# Patient Record
Sex: FEMALE | Race: WHITE | NOT HISPANIC OR LATINO | Employment: OTHER | URBAN - METROPOLITAN AREA
[De-identification: names, ages, dates, MRNs, and addresses within clinical notes are randomized per-mention and may not be internally consistent; named-entity substitution may affect disease eponyms.]

---

## 2017-01-04 ENCOUNTER — CLINICAL SUPPORT (OUTPATIENT)
Dept: REHABILITATION | Facility: HOSPITAL | Age: 32
End: 2017-01-04
Attending: PHYSICAL MEDICINE & REHABILITATION
Payer: COMMERCIAL

## 2017-01-04 DIAGNOSIS — I63.512 CEREBRAL INFARCTION DUE TO OCCLUSION OF LEFT MIDDLE CEREBRAL ARTERY: Primary | ICD-10-CM

## 2017-01-04 PROCEDURE — 92507 TX SP LANG VOICE COMM INDIV: CPT | Mod: PN

## 2017-01-04 NOTE — PROGRESS NOTES
"TIME RECORD    Date:  01/04/2017    Start Time:  10:04  Stop Time:  10:58    PROCEDURES:    TIMED  Procedure Time Min.    Start:  Stop:     Start:  Stop:     Start:  Stop:     Start:  Stop:          UNTIMED  Procedure Time Min.   Speech & language tx Start:10:04  Stop: 10:58  54    Start:  Stop:      Total Untimed Units:  1  Charges Billed/# of units:  1      Progress/Current Status    Subjective:     Patient ID: Amy Orta is a 31 y.o. female.  Diagnosis:   1. Cerebral infarction due to occlusion of left middle cerebral artery       Pain: 0 /10  Mrs. Orta denies pain today.  She presents alert, cooperative and explains, "We had a nice time over the holidays." Patient presents for first visit since 12/19/2016 2/2 holiday plans.    Objective:     1. Patient will write short paragraph description (4-5 sentences) for picture scene with appropriate length sentences and 90% accuracy on spelling, MOD I.  Fx writing tasks today for instructions/directions from school to house, SLP noted Patient requires extra time, modified  also noted 2/2 decreased R UE mobility.   2. Patient will complete moderate level abstract reasoning tasks with 90% accuracy, MOD I, to improve verbal reasoning.  Today she explains the meaning of metaphor/simile 12/14 I'ly, requires min a for word-finding to explain 14/14.   3. Patient will appropriately participate in short (5-10 minute) one-to-one conversations with appropriate turn-taking and topic maintenance 90% of the time, Independently, to improve expressive language.  Today she reviewed holdays w/tih ST for 8 minutes with proper give and take and initiation of questions, I'ly.    4. Patient will complete oral reading tasks at the paragraph level with 90% intelligibility, MOD I, to improve speech.  5 paragraphs with 6 verbal cues for repetition.    5. Patient will discriminate between intelligible and unintelligible speech 90% of the time t/o therapy tasks, Independently  6. " Patient will accurately produce s-blends in all positions, 90% of attempts, MOD I .  SLP noted MIld difficulty with St/SK at the sentence level 4/14 attempts today.    Assessment:     Mrs. 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 She requires extra time and decreased distractions for fx writing tasks this service day.  She completes  Oral reading tasks at the paragraph level provided minimal verbal.vosual cueing to monitor ROS and articulatory precision for multisyllbic words (i.e. Strategies.) She self-monitors speech following set-up. Noted improvement on verbal abstraction tasks today.     Patient Education/Response:     Mrs. Orta is enthusiastic t/o session and participates well with ST. She is encouraged today to continue oral reading, fx writing and verbal abstraction tasks daily for carryover of compensatory word-finding and speech strategies. She v/u recommendations.      Plans and Goals:     Continue POC.   DIANA Hatch., CCC-SLP

## 2017-01-06 ENCOUNTER — CLINICAL SUPPORT (OUTPATIENT)
Dept: REHABILITATION | Facility: HOSPITAL | Age: 32
End: 2017-01-06
Attending: PHYSICAL MEDICINE & REHABILITATION
Payer: COMMERCIAL

## 2017-01-06 DIAGNOSIS — I63.512 CEREBRAL INFARCTION DUE TO OCCLUSION OF LEFT MIDDLE CEREBRAL ARTERY: Primary | ICD-10-CM

## 2017-01-06 PROCEDURE — 92507 TX SP LANG VOICE COMM INDIV: CPT | Mod: PN

## 2017-01-06 NOTE — PROGRESS NOTES
"TIME RECORD    Date:  01/06/2017    Start Time:  10:05  Stop Time:  11:00    PROCEDURES:    TIMED  Procedure Time Min.    Start:  Stop:     Start:  Stop:     Start:  Stop:     Start:  Stop:          UNTIMED  Procedure Time Min.   Speech & language tx Start:10:05  Stop: 11:00  54    Start:  Stop:      Total Untimed Units:  1  Charges Billed/# of units:  1      Progress/Current Status    Subjective:     Patient ID: Amy Orta is a 31 y.o. female.  Diagnosis:   1. Cerebral infarction due to occlusion of left middle cerebral artery       Pain: 0 /10  Mrs. Orta denies pain today.  She presents alert, cooperative and explains, "My boss wants me to return to 30 hours/week after January."    Objective:     1. Patient will write short paragraph description (4-5 sentences) for picture scene with appropriate length sentences and 90% accuracy on spelling, MOD I.  Fx writing tasks today for pt information form.  She completes writing tasks at word level with correct spelling 100% of attempts t/o abstraction tasks.   2. Patient will complete moderate level abstract reasoning tasks with 90% accuracy, MOD I, to improve verbal reasoning.  Today she explains 2+ ea similarities and differences 18/20 attempts I'ly, requiring verbal prompts x2 to complete 20/20. She lists items in a sequence 10/10 attempts, I'ly.    3. Patient will appropriately participate in short (5-10 minute) one-to-one conversations with appropriate turn-taking and topic maintenance 90% of the time, Independently, to improve expressive language.  Today discussed job for 9 minutes with mild word-finding difficulty noted c/b mildly delayed response time and mild hesitations requiring her to utilize pacing/breathing.  Patient with with proper give and take and initiation of questions, I'ly.    4. Patient will complete oral reading tasks at the paragraph level with 90% intelligibility, MOD I, to improve speech. 2 paragraphs today   5. Patient will discriminate " between intelligible and unintelligible speech 90% of the time t/o therapy tasks, Independently.. Patient requires Supervision for 2 verbal cues for repetition (dipthongs)   6. Patient will accurately produce s-blends in all positions, 90% of attempts, MOD I .  SLP noted MIld difficulty with St/SK in final position of words today.  Reads 11/12 ST/SN/SW blends in initial position WFL.     Assessment:     Mrs. 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 She demonstrates increased response time and word-finding t/o verbal abstraction tasks today for similarities and differences. She requires extra time and decreased distractions for fx writing tasks.  She completes  Oral reading tasks at the paragraph level provided minimal verbal/visual cueing to monitor ROS and articulatory precision for multisyllbic words (i.e. Strategies.) She self-monitors speech t/o oral reading and conversational speech tasks today.  She continues to require minimal verbal cueing for placement of articulators for SK blends in final position.     Patient Education/Response:     Mrs. Orta is enthusiastic and demonstrates excellent effort t/o session.  Patient highly motivated. She is encouraged today to continue oral reading, fx writing tasks daily at the home level.  Patient confirms she will bring in lap-top for mock-work conversation/tasks next session.      Plans and Goals:     Continue POC.   DIANA Hatch., CCC-SLP

## 2017-01-09 ENCOUNTER — CLINICAL SUPPORT (OUTPATIENT)
Dept: REHABILITATION | Facility: HOSPITAL | Age: 32
End: 2017-01-09
Attending: PHYSICAL MEDICINE & REHABILITATION
Payer: COMMERCIAL

## 2017-01-09 ENCOUNTER — CLINICAL SUPPORT (OUTPATIENT)
Dept: REHABILITATION | Facility: HOSPITAL | Age: 32
End: 2017-01-09
Attending: PSYCHIATRY & NEUROLOGY
Payer: COMMERCIAL

## 2017-01-09 DIAGNOSIS — Z78.9 IMPAIRED INSTRUMENTAL ACTIVITIES OF DAILY LIVING (IADL): ICD-10-CM

## 2017-01-09 DIAGNOSIS — I63.512 CEREBRAL INFARCTION DUE TO OCCLUSION OF LEFT MIDDLE CEREBRAL ARTERY: Primary | ICD-10-CM

## 2017-01-09 DIAGNOSIS — R29.898 DECREASED GRIP STRENGTH OF RIGHT HAND: ICD-10-CM

## 2017-01-09 DIAGNOSIS — R68.89 IMPAIRED FUNCTION OF UPPER EXTREMITY: Primary | ICD-10-CM

## 2017-01-09 PROCEDURE — 92507 TX SP LANG VOICE COMM INDIV: CPT | Mod: PN

## 2017-01-09 PROCEDURE — 97530 THERAPEUTIC ACTIVITIES: CPT | Mod: PN

## 2017-01-09 PROCEDURE — 97110 THERAPEUTIC EXERCISES: CPT | Mod: PN

## 2017-01-09 NOTE — PROGRESS NOTES
"Occupational Therapy    Date:  01/09/2017  Start Time:  0915  Stop Time:  1000    TIMED  Procedure Time Min.   TherEx (2) Start:  Stop: 30   TherAct (1) Start:  Stop: 15   Total Timed Minutes:  45  Total Timed Units:  3  Total Untimed Units:  0  Charges Billed/# of units:  3    Progress/Current Status    Subjective:     Patient ID: Amy Orta is a 31 y.o. female.  Diagnosis:   1. Impaired function of upper extremity     2. Lack of coordination due to stroke     3. Decreased  strength of right hand     4. Impaired instrumental activities of daily living (IADL)       Pain: 3 /10 in arm distal to elbow. It mostly "aches," especially since it got cold. Pt denies pain proximal to elbow. She says she has been trying to use the arm more, but does not notice any functional change. She does note that the L UE often assists the right.  She stopped doing cross-fit about 2 months ago as there were changes in staff and she did not feel as comfortable.    Objective:     MCP extension while holding pen, 10x5 reps.   - 10, 19, 15 (avg=18.67)  FMC: 9HPT: 89s  Pronation/supination for pouring water x30 in each direction + x10 for pronation only w/ mod cues to "keep elbow in."  MMT adduction 3+/4, ER 4-/5, extension 4-/5.  Red t-band for shoulder extension, adduction and ER, 3x10 each (+ HEP)    Assessment:     This is only pt's 3rd visit in the past 5 weeks d/t holidays and cancellations. She reports incorporating the R UE at home, but does state the the L UE often assists the R. She demo's a slight decline in FMC and  strength compared to 12/14. She can continue to benefit from skilled OT as she continues to have potential to improve R UE function for eventual return to work and improved indep and efficiency with IADLs.    Patient Education/Response:     Force use of the R UE at home. Restrain the L UE if necessary. Pt v/u.    Plans and Goals:     Review new exs for proximal strengthening and progress as " tolerated. [facility] [place].    Aleta Nixon, MORIAH, LOTR  1/9/2017

## 2017-01-09 NOTE — PROGRESS NOTES
"TIME RECORD    Date:  01/09/2017    Start Time:  10:07  Stop Time: 11:04    PROCEDURES:    TIMED  Procedure Time Min.    Start:  Stop:     Start:  Stop:     Start:  Stop:     Start:  Stop:          UNTIMED  Procedure Time Min.   Speech and Language tx Start:10:07  Stop:11:04 53    Start:  Stop:      Total Untimed Units:  1  Charges Billed/# of units:  1      Progress/Current Status    Subjective:     Patient ID: Amy Orta is a 31 y.o. female.  Diagnosis:   1. Cerebral infarction due to occlusion of left middle cerebral artery       Pain: 0 /10  She denies pain this service day. She presents alert and with her  in waiting room. SLP discussed timelines for return to work with Patient and spouse prior to session. Her spouse explains, "I noticed when she was doing her re-certifications for her job it was exhausting for her, if she is having to make decisions on something it makes it harder. "  Mrs. Orta explains, "I am going to follow up with boss today after work he asked me to let me know how it went today with my laptop."   Objective:     2. Patient will complete moderate level abstract reasoning tasks with 90% accuracy, MOD I, to improve verbal reasoning.  Fx similarities/differences with work terms todayx6 ( i.e correction, QC, exceptions; shift v. trend) provided extra time and Supervision for use of word-finding strategies.    3. Patient will appropriately participate in short (5-10 minute) one-to-one conversations with appropriate turn-taking and topic maintenance 90% of the time, Independently, to improve expressive language. Today she completed 40 minute demo of work tasks/reports processes for ITT EXIM system in mock training scenario.    5. Patient will discriminate between intelligible and unintelligible speech 90% of the time t/o therapy tasks, Independently    Assessment:     Mrs. Orta presents with Mild Anomic Aphasia and Mild Dysarthria. Mild word-finding difficulty noted t/o verbal " "reasoning (explain the difference in shift v. Trend)  characterized by mild hesitations and mild prolongations of syllable sin initial position of words. She demonstrates proper response time to all questions asked by SLP and appropriate topic-maintenance t/o mock training, Independently.  She continues to demonstrate mildly decreased articulatory precision with /r/ v. /l/ and s-blends (s/sh,) requiring minimal verbal/visual cueing from SLP to re-attempt with slower ROS.  Amy self-monitors intelligibly of work terminology t/o mock training provided Supervision for clarification x4 on multipsyllabic work terms (i.e. "shift, critical, SD2.')     Patient Education/Response:     Patient is highly motivated.  She expresses increased anxiety today about demands of work load with dependency of R UE for typing/instrumentation.  She is encouraged today to incorporate more time at computer at the home level (i.e. Oral reading, emailing, multi-tasking) to increase endurance. SLP also discussed mock phone calls/speech drills with listener for Patient to drill on work terminology and receive immediate feedback.  Amy v/pat SLP recommendations for ongoing HEP.     Plans and Goals:     ST to continue to follow.   DIANA Hatch., CCC-SLP      "

## 2017-01-11 ENCOUNTER — CLINICAL SUPPORT (OUTPATIENT)
Dept: REHABILITATION | Facility: HOSPITAL | Age: 32
End: 2017-01-11
Attending: PHYSICAL MEDICINE & REHABILITATION
Payer: COMMERCIAL

## 2017-01-11 DIAGNOSIS — R68.89 IMPAIRED FUNCTION OF UPPER EXTREMITY: Primary | ICD-10-CM

## 2017-01-11 DIAGNOSIS — R29.898 DECREASED GRIP STRENGTH OF RIGHT HAND: ICD-10-CM

## 2017-01-11 DIAGNOSIS — Z78.9 IMPAIRED INSTRUMENTAL ACTIVITIES OF DAILY LIVING (IADL): ICD-10-CM

## 2017-01-11 PROCEDURE — 97110 THERAPEUTIC EXERCISES: CPT | Mod: PN

## 2017-01-11 PROCEDURE — 97530 THERAPEUTIC ACTIVITIES: CPT | Mod: PN

## 2017-01-11 NOTE — PROGRESS NOTES
Occupational Therapy  Date:  01/11/2017    Start Time:  0910  Stop Time:  1004    TIMED  Procedure Time Min.   TherAct (2) Start:  Stop: 24   TherEx (2) Start:  Stop: 30   Total Timed Minutes:  54  Total Timed Units:  4  Total Untimed Units:  0  Charges Billed/# of units:  4    Progress/Current Status    Subjective:     Patient ID: Amy Orta is a 31 y.o. female.  Diagnosis:   1. Impaired function of upper extremity     2. Lack of coordination due to stroke     3. Decreased  strength of right hand     4. Impaired instrumental activities of daily living (IADL)       Pain: 0 /10  Pt admits to using the L UE mostly when cooking at home.    Objective:     MCP extension while holding pen, 10x5 reps.  Ball and oven mitt over the L UE to force R UE function.  Yellow putty for squeeze and turn x~20 (using table if necessary to reposition putty). Roll out putty w/ the R UE and then pinch down length w/ 3-jaw velma x2 sets.  Large nut/bolt to undo/ redo x1 w/ focus on having contact between thumb, IF & MF on bolt (on R side of board). Attempted a 2nd bolt (in middle of board), but unable.  3# for wrist extension 3x10, RD 3x10    Red t-band for adduction, extension & ER, 3x10.  Hand writing - pt still having difficulty positioning fingers and pronating sufficiently for pen to keep contact with paper.    Assessment:     R UE function limited by decreased motor planning, weakness and FMC impairments. Amy can continue to benefit from skilled OT to address these concerns for increased indep and efficiency with all tasks.    Patient Education/Response:     Continue HEP. OT stressed importance of pt forcing functional use of the R UE EVERY DAY for AT LEAST 1 hour. Pt v/u    Plans and Goals:     Continue strengthening, coordination, fxl use R UE.    Aleta Nixon, MORIAH, LOTR  1/11/2017

## 2017-01-18 ENCOUNTER — CLINICAL SUPPORT (OUTPATIENT)
Dept: REHABILITATION | Facility: HOSPITAL | Age: 32
End: 2017-01-18
Attending: PHYSICAL MEDICINE & REHABILITATION
Payer: COMMERCIAL

## 2017-01-18 DIAGNOSIS — Z78.9 IMPAIRED INSTRUMENTAL ACTIVITIES OF DAILY LIVING (IADL): ICD-10-CM

## 2017-01-18 DIAGNOSIS — R68.89 IMPAIRED FUNCTION OF UPPER EXTREMITY: Primary | ICD-10-CM

## 2017-01-18 DIAGNOSIS — R29.898 DECREASED GRIP STRENGTH OF RIGHT HAND: ICD-10-CM

## 2017-01-18 DIAGNOSIS — I63.512 CEREBRAL INFARCTION DUE TO OCCLUSION OF LEFT MIDDLE CEREBRAL ARTERY: Primary | ICD-10-CM

## 2017-01-18 PROCEDURE — 97530 THERAPEUTIC ACTIVITIES: CPT | Mod: PN

## 2017-01-18 PROCEDURE — 97110 THERAPEUTIC EXERCISES: CPT | Mod: PN

## 2017-01-18 PROCEDURE — 92507 TX SP LANG VOICE COMM INDIV: CPT | Mod: PN

## 2017-01-18 NOTE — PROGRESS NOTES
Occupational Therapy    Date:  01/18/2017  Start Time:  0918  Stop Time:  1000    TIMED  Procedure Time Min.   TherAct (2) Start:  Stop: 32   TherEx (1) Start:  Stop: 10   Total Timed Minutes:  42  Total Timed Units:  3  Total Untimed Units:  0  Charges Billed/# of units:  3    Progress/Current Status    Subjective:     Patient ID: Amy Orta is a 31 y.o. female.  Diagnosis:   1. Impaired function of upper extremity     2. Lack of coordination due to stroke     3. Decreased  strength of right hand     4. Impaired instrumental activities of daily living (IADL)       Pain: 0 /10  Going to work tomorrow for 4 hours. Plan is to do 4 hours at work this week and 4 hours at home next week and build from there as she can tolerate.  She says she is nervous. Tomorrow, she anticipates she will have to sign on to the computer and mostly do paperwork.  She says she used the R UE more at home this week.    Objective:     Using L UE to stabilize only, pt used R UE to open various containers with screw-on and pry-off lids, multiple shapes and sizes, x22.  Pt noted to have hyperextension of RF PIP and decreased control of that digit..  KT to manually correct for increased RF PIP flexion and to facilitate RF dorsal interosseus.  Tall nut/bolt board for the 3 largest to undo and re-do nuts. Board positioned parallel to pt to simulate positions of knobs at work. Min A to initiate turning 1/3 knobs d/t difficulty. She does note that most of the knobs she has to manipulate at work are in tight spaces. She will take pictures at work tomorrow.  Green t-band for shoulder ER, extension and adduction, 2x10.     Assessment:     R hand function continues to be better with manual correction and facilitation of RF. Hopefully after some exposure to her work environment, pt will be able to ID additional tasks that we can work to address in clinic. Pt can continue to benefit from skilled OT to improve R UE function for increased indep w/  work and IADL tasks.    Patient Education/Response:     Continue HEP w/ green band, continue working on MP extension w/ IP flexion.    Plans and Goals:     R UE FMC and strengthening for work-related tasks.    Aleta Nixon, MORIAH, LOTR  1/18/2017

## 2017-01-18 NOTE — PROGRESS NOTES
"TIME RECORD    Date:  01/18/2017    Start Time: 10:07  Stop Time: 11:02    PROCEDURES:    TIMED  Procedure Time Min.    Start:  Stop:     Start:  Stop:     Start:  Stop:     Start:  Stop:          UNTIMED  Procedure Time Min.   Speech & Language Start:10:07  Stop:11:02  55 min.    Start:  Stop:      Total Untimed Units:  1  Charges Billed/# of units:  1    Progress/Current Status    Subjective:     Patient ID: Amy Orta is a 31 y.o. female.  Diagnosis:   1. Cerebral infarction due to occlusion of left middle cerebral artery       Pain: 0 /10  "I'm going to try going back to work for four hours tomorrow, Krunal will drive me."    Objective:     1. Patient will write short paragraph description (4-5 sentences) for picture scene with appropriate length sentences and 90% accuracy on spelling, MOD I. Email tasks, short paragraph level)x2 this service day  3. Patient will appropriately participate in short (5-10 minute) one-to-one conversations with appropriate turn-taking and topic maintenance 90% of the time, Independently, to improve expressive language  Patient completes conversation re: return to work for 12 minutes with appropriate give and take and appropriate length responses to all questions from communication partner. MOD I.   4. Patient will complete oral reading tasks at the paragraph level with 90% intelligibility, MOD I, to improve speech. see note  5. Patient will discriminate between intelligible and unintelligible speech 90% of the time t/o therapy tasks, Independently.  Ongoing Supervision (prompts x4 today.)  6. Patient will accurately produce s-blends in all positions, 90% of attempts, MOD I.   Focus on sk blends today-14 sentences, 4-paragraph story  7. Patient will accurately produce lingual-alveolar sounds (t,d,s,z) in all positions, 90% of attempts, MOD I. Focus on t/d discrimination tasks at the multisyllabic (x10) level, sentence level (x16) and at the paragraph level (4 paragraph-length " story.)    Assessment:     Patient with mild Dysarthria. She completes fx writing tasks (email tasks, short paragraph level)x2 this service day, with appropriate length sentences and 90% accuracy on spelling, MOD I. She completes oral reading tasks at the paragraph level provided SPV for clarification of s-blend and fricative sounds in medial position. Patient completes sentence-drills for s-blends and t/d discrimination tasks with 90% accuracy across all attempts. She self-monitors ROS I'ly this service day. She demosntrates increased prosody t/o oral reading tasks at both the sentence and paragraph level.     Patient Education/Response:     Patient educated on pacing strategies and ongoing compensatory strategies for speech and language for anticipated return to work. Patient v/u recommendations.     Plans and Goals:     Continue per POC.   DIANA Hatch., CCC-SLP

## 2017-01-23 ENCOUNTER — CLINICAL SUPPORT (OUTPATIENT)
Dept: REHABILITATION | Facility: HOSPITAL | Age: 32
End: 2017-01-23
Attending: PHYSICAL MEDICINE & REHABILITATION
Payer: COMMERCIAL

## 2017-01-23 ENCOUNTER — TELEPHONE (OUTPATIENT)
Dept: NEUROLOGY | Facility: CLINIC | Age: 32
End: 2017-01-23

## 2017-01-23 DIAGNOSIS — Z78.9 IMPAIRED INSTRUMENTAL ACTIVITIES OF DAILY LIVING (IADL): ICD-10-CM

## 2017-01-23 DIAGNOSIS — R29.898 DECREASED GRIP STRENGTH OF RIGHT HAND: ICD-10-CM

## 2017-01-23 DIAGNOSIS — R68.89 IMPAIRED FUNCTION OF UPPER EXTREMITY: Primary | ICD-10-CM

## 2017-01-23 PROCEDURE — 97530 THERAPEUTIC ACTIVITIES: CPT | Mod: PN

## 2017-01-23 NOTE — TELEPHONE ENCOUNTER
"Spoke with patient's . He states that the patient is out of her Percocet and needs a refill. He also states that the patient says "They help a little, just enough to take the edge off, but they don't knock it out and I am still unable to sleep at night". He was wondering if instead of filling this there was possibly something else she could try. Message sent to Dr. Mcginnis.     "

## 2017-01-23 NOTE — PROGRESS NOTES
Occupational Therapy    Date:  01/23/2017  Start Time:  0907  Stop Time:  1005    TIMED  Procedure Time Min.   TherAct (4) Start:  Stop: 58   Total Timed Minutes:  58  Total Timed Units:  4  Total Untimed Units:  0  Charges Billed/# of units:  4    Progress/Current Status    Subjective:     Patient ID: Amy Orta is a 31 y.o. female.  Diagnosis:   1. Impaired function of upper extremity     2. Lack of coordination due to stroke     3. Decreased  strength of right hand     4. Impaired instrumental activities of daily living (IADL)       Pain: 0 /10  Pt said she did go to work Thursday. While there, she wrote, typed, pushed touch screen buttons with the R UE, used the mouse w/ the R UE  She brought test tubes, insert cup and pipette to practice manipulation with the R UE.    Objective:     Pt used the R UE to hold test tube while the R thumb pushed off the lid. Pt unable to complete this task holding the tube upright, so she completed it with the tube to the side x5 trials w/ increased time.  Using pipette, pt used the R UE to move water from one tube to another, holding both tubes with the L UE, as she would have done at work; ~15 reps in each direction.  MCP extension AROM while holding test-tube, 4x3 reps.  KT for manual correction to increase RF PIP flexion/ decrease RF PIP hyperextension. KT to facilitate R RF dorsal interosseus, opponens and FPL, all applied proximal->distal w/ ~25% tension.  Manipulation of nut/bolt to simulate work environment. Perf'd 1 medium set reaching underneath, 3 medium to large sets from the right.  Pt using phone to look up information and automatically using the L UE to scroll and type. Pt ed to use the R UE for this. She scrolled and typed with the R UE w/ significantly increased time.    Assessment:     Good initial effort simulating work tasks with test tubes, pipette. R hand remains with impaired coordination, motor planning and strength, all which impair  function.    Patient Education/Response:     Practice pushing lids off test tubes, MCP extension, green band, use R UE on phone! Pt v/u.    Plans and Goals:     Continue strengthening, coordination, simulation of work tasks.    Aleta Nixon, MORIAH, LOTR  1/23/2017

## 2017-01-24 ENCOUNTER — PATIENT MESSAGE (OUTPATIENT)
Dept: NEUROLOGY | Facility: CLINIC | Age: 32
End: 2017-01-24

## 2017-01-24 RX ORDER — OXYCODONE AND ACETAMINOPHEN 7.5; 325 MG/1; MG/1
1 TABLET ORAL EVERY 6 HOURS PRN
Qty: 20 TABLET | Refills: 0 | Status: SHIPPED | OUTPATIENT
Start: 2017-01-24 | End: 2017-03-15 | Stop reason: SDUPTHER

## 2017-01-24 NOTE — TELEPHONE ENCOUNTER
Informed patient that Dr. Mcginins refilled her Percocet at a higher dose. Patient thanked me and verbalized understanding.

## 2017-01-30 ENCOUNTER — CLINICAL SUPPORT (OUTPATIENT)
Dept: REHABILITATION | Facility: HOSPITAL | Age: 32
End: 2017-01-30
Attending: PHYSICAL MEDICINE & REHABILITATION
Payer: COMMERCIAL

## 2017-01-30 DIAGNOSIS — R29.898 DECREASED GRIP STRENGTH OF RIGHT HAND: ICD-10-CM

## 2017-01-30 DIAGNOSIS — Z78.9 IMPAIRED INSTRUMENTAL ACTIVITIES OF DAILY LIVING (IADL): ICD-10-CM

## 2017-01-30 DIAGNOSIS — R68.89 IMPAIRED FUNCTION OF UPPER EXTREMITY: Primary | ICD-10-CM

## 2017-01-30 DIAGNOSIS — I63.512 CEREBRAL INFARCTION DUE TO OCCLUSION OF LEFT MIDDLE CEREBRAL ARTERY: Primary | ICD-10-CM

## 2017-01-30 PROCEDURE — 97530 THERAPEUTIC ACTIVITIES: CPT | Mod: PN

## 2017-01-30 PROCEDURE — 92507 TX SP LANG VOICE COMM INDIV: CPT | Mod: PN

## 2017-01-30 NOTE — PROGRESS NOTES
"TIME RECORD    Date:  01/30/2017    Start Time:  10:06  Stop Time:  11:00    PROCEDURES:    TIMED  Procedure Time Min.    Start:  Stop:     Start:  Stop:     Start:  Stop:     Start:  Stop:          UNTIMED  Procedure Time Min.   Speech tx Start:10:06  Stop:11:00  54    Start:  Stop:        Total Untimed Units:  1  Charges Billed/# of units: 1      Progress/Current Status    Subjective:     Patient ID: Amy Orta is a 31 y.o. female.  Diagnosis:   1. Cerebral infarction due to occlusion of left middle cerebral artery       Pain: 0 /10  Mrs. Orta denies pain. She explains, "I was so tired after I left work, I was asleep before we even got home."     Objective:     3. Patient will appropriately participate in short (5-10 minute) one-to-one conversations with appropriate turn-taking and topic maintenance 90% of the time, Independently, to improve expressive language.  13 minutes conversation re: trial return to work today     4. Patient will complete oral reading tasks at the paragraph level with 90% intelligibility, MOD I, to improve speech.  She completes phoneme specific stories for s-blends and t/d discrimination x3 today, 4+ paragraphs ea    5. Patient will discriminate between intelligible and unintelligible speech 90% of the time t/o therapy tasks, Independently    6. Patient will accurately produce s-blends in all positions, 90% of attempts, MOD I.  14/15 mutlisyllabic, 13/14 sentences for st/sp, MOD I    7. Patient will accurately produce lingual-alveolar sounds (t,d,s,z) in all positions, 90% of attempts, MOD I.  t/d discrimnation tasks at word and sentence level x16 ea, MOD I      Assessment:     Mrs. Orta presents with Mild Anomic Aphasia and Mild Speech Impairment characterized by mildly decreased articualtory precision with lingual alveolars, glides, dipthongs and fricatives in medial and final positions. SHe self-monitors speech and utilizes clear speech strategies, MOD I. She demonstrates mild " "word-finding difficulty t/o verbal reasoning and structred conversational tasks this service day  characterized by hesitations and interjections ("um, "uh,"oh what is it,")  Requiring set-up of word-finding strategiesx1 and phonemic cues x2.     Patient Education/Response:     Patient alert and enthusiastic for session. She reports ongoing Migraine headaches daily. She reports ongoing mild difficulty with explanations of work procedures and technical terms.  She is provided compensatory strategies for word-finding and conversational speech tasks today along with recommendations for use of environmental and behavioral modifications to reduce distractions for sustained endurance and attention t/o work tasks.     Plans and Goals:     Continue per POC.  DIANA Hatch., CCC-SLP      "

## 2017-01-30 NOTE — PROGRESS NOTES
Occupational Therapy    Date:  01/30/2017  Start Time:  0908  Stop Time:  1004    TIMED  Procedure Time Min.   therAct (4) Start:  Stop: 56   Total Timed Minutes:  56  Total Timed Units:  4  Total Untimed Units:  0  Charges Billed/# of units:  4    Progress/Current Status    Subjective:     Patient ID: Amy Orta is a 31 y.o. female.  Diagnosis:   1. Impaired function of upper extremity     2. Lack of coordination due to stroke     3. Decreased  strength of right hand     4. Impaired instrumental activities of daily living (IADL)       Pain: Pt did not complain of pain during tx today.  Pt has been having back and side pain and so had a CT last week. They found a mass on one of her kidneys. She has to have an ultrasound today at 1.    Objective:     KT for mechanical correction of RF to increase IP flexion and decrease hyperextension. KT also to facilitate APL, opponens and RF dorsal interosseus.    Opposition, 2x10, thumb to each digit. Placed obstacle in pt's web space to encourage increased IP flexion. Pt also encouraged to perform full palmar abduction after each oppositional movement.    Attempted pushing caps off test tubes, but this was very difficult today and pt was only able to complete x1 despite ed regarding optimal positioning of the hand.    Pipetting water from cup to test tube. Pt resting largely in adduction despite KT and cues and using thumb MP flexion/ opposition to control test tube as opposed to IP flexion. Improved performance with OT manually holding pt in palmar abduction.    Pushing caps onto test tubes x8 - pt cued to increase radial deviation of wrist, increase MP flexion to keep better  on test tube and to increase IP flexion of thumb to push cap on.    Proprioception - pt encouraged to reach for 2 different targets (1 hook and 1 loop velcro) with eyes open x10 and then with eyes closed x~10 to improve R hand proprioception. Pt then completed with 3 targets (1 loop and 2  hook velcro) in an ABC pattern with eyes open x10 and then with eyes closed x~10. Finally, she completed 3 targets in B-C-B-A pattern with eyes open x10 and then with eyes closed x~10     Assessment:     R hand function remains impaired for the FMC and in-hand manipulation tasks necessary for her to complete work responsibilities. Amy can continue to benefit from skilled OT to improve R UE function for improved ease and indep w/ all life roles.    Patient Education/Response:     Continue HEP. Add proprioception task.    Plans and Goals:     Continue FMC.    Aleta Nixon, MOT, LOTR  1/30/2017

## 2017-02-13 ENCOUNTER — CLINICAL SUPPORT (OUTPATIENT)
Dept: REHABILITATION | Facility: HOSPITAL | Age: 32
End: 2017-02-13
Attending: PSYCHIATRY & NEUROLOGY
Payer: COMMERCIAL

## 2017-02-13 DIAGNOSIS — R68.89 IMPAIRED FUNCTION OF UPPER EXTREMITY: Primary | ICD-10-CM

## 2017-02-13 DIAGNOSIS — I63.512 CEREBRAL INFARCTION DUE TO OCCLUSION OF LEFT MIDDLE CEREBRAL ARTERY: Primary | ICD-10-CM

## 2017-02-13 DIAGNOSIS — Z78.9 IMPAIRED INSTRUMENTAL ACTIVITIES OF DAILY LIVING (IADL): ICD-10-CM

## 2017-02-13 DIAGNOSIS — R29.898 DECREASED GRIP STRENGTH OF RIGHT HAND: ICD-10-CM

## 2017-02-13 PROCEDURE — 97110 THERAPEUTIC EXERCISES: CPT | Mod: PN

## 2017-02-13 PROCEDURE — 97530 THERAPEUTIC ACTIVITIES: CPT | Mod: PN

## 2017-02-13 PROCEDURE — 92507 TX SP LANG VOICE COMM INDIV: CPT | Mod: PN

## 2017-02-13 NOTE — PLAN OF CARE
"TIME RECORD    Date:  02/13/2017    Start Time:  10:10  Stop Time:  10:52    PROCEDURES:    TIMED  Procedure Time Min.    Start:  Stop:     Start:  Stop:     Start:  Stop:     Start:  Stop:          UNTIMED  Procedure Time Min.   Speech tx Start:10:10  Stop:10:52 42    Start:  Stop:      Total Untimed Units:  1  Charges Billed/# of units:  1      Progress/Current Status    Subjective:     Patient ID: Amy Orta is a 31 y.o. female.  Diagnosis:   1. Cerebral infarction due to occlusion of left middle cerebral artery       Pain: 0 /10  She explains, "I have been going to work four hours and working from home 43 hours, so 8 hours total each week."  Patient presents for first session since 1/30/2017 due to Patient's cancellations due to personal scheduling needs and children's MD appointments.     Objective:     2. Patient will complete moderate level abstract reasoning tasks with 90% accuracy, MOD I, to improve verbal reasoning. MET. Appears fx this service day, Patient uses word finding and speech strategies I'ly.   3. Patient will appropriately participate in short (5-10 minute) one-to-one conversations with appropriate turn-taking and topic maintenance 90% of the time, Independently, to improve expressive language.  MET. 100% of the time, I'ly today  5. Patient will discriminate between intelligible and unintelligible speech 90% of the time t/o therapy tasks, Independently. MET.     Assessment:     Patient presents with Mild Dysarthria. She uses clear speech strategies this service day t/o conversational speech tasks Leslye. She demonstrates proper ROS, intensity and self-monitoring t/o verbal reasoning tasks. SHe demonstrates prior give and take and pragmatics t/o structured and unstructured conversational tasks. She has met all of her Short Term and Long Term Goals. She is working 8 hours per week and  Is continuing care with Suman Mcginnis M.D, 2/2 chronic, daily Migraine headaches. SLP reviewed " recommendations for discharge from OP ST this service day and Patient v/u recommendations.     Patient Education/Response:     Patient with cheerful disposition t/o session.  She was educated on ongoing compensatory strategies for speech and recommendations for ongoing f/u with neurologist and Dr. Mcginnis 2/2 daily Migraine headaches today. Patient v/u for ongoing f/u and pacing strategies. Patient educated on option for stroke support groups and consultation with SW/GUALBERTO 2/2 expression of ongoing anxiety. Patient v/u of psychosocial support resources.     Plans and Goals:     D/c OP ST and continue with HEP.      REHAB SERVICES OUTPATIENT DISCHARGE SUMMARY  Speech Therapy    Name:  Amy Orta  Date:  2/13/2017  Date of Evaluation:  5/31/2017  Physician:  Sylvia Mcginnis MD   Total # Of Visits: 69      Diagnosis:    1. Cerebral infarction due to occlusion of left middle cerebral artery         Physical/Functional Status:  At time of discharge, Patient presents with Mild Anomic Aphasia and Mild Dysarthria.  Patient currently on limited work schedule (~8 hrs/week) 2/2 chronic, daily Migraine headaches.  Patient is able to complete written and conversational tasks for social and vocational setings MOD I using compensatory strategies for word-finding. When encountering difficulty with speech production,  she usually uses compensatory strategies Independently.  She is aware of home exercises to continue to practice to maintain endurance for speech (i.e. Oral reading) and language (i.e. journal writing) for anticipated return to full-time work.      The patient is to be discharged from our Therapy service for the following reason(s):  Patient has met all of his/her goals.  She is expressing interest in discharging OP ST during final treatment session this service day.     Degree of Goal Achievement:  Patient has met all goals    Patient Education:  Patient educated on plans for discharge from OP ST. Patient is  in agreement with SLP. Patient with decreased attendance for ST (attending 8 treatment sessions across past 2 months) and expressing she is aware of strategies to assist her as she transitions back into work. Patient currently on limited schedule (~8 hrs/week) 2/2 chronic, daily Migraine headaches. Patient encouraged to continue ongoing f/u with neurologist and Dr. Mcginnis 2/2 daily Migraine headaches and re-consult ST should she encounter any change in status or increased difficulty as she transitions back to work full time. Patient v/u SLP recommendations.      Discharge Plan:  Home Program (daily oral reading, writing and mental flexibility tasks to maintain endurance) and Other:  Patient encouraged to continue to f/u with ongoing f/u with neurologist and Dr. Mcginnis 2/2 daily Migraine headaches and re-consult ST should she encounter any change in status or increased difficulty as she transitions back to work full time. Patient v/u SLP recommendations.      DIANA Hatch., CCC-SLP  2/13/2017

## 2017-02-15 ENCOUNTER — CLINICAL SUPPORT (OUTPATIENT)
Dept: REHABILITATION | Facility: HOSPITAL | Age: 32
End: 2017-02-15
Attending: PSYCHIATRY & NEUROLOGY
Payer: COMMERCIAL

## 2017-02-15 DIAGNOSIS — R29.898 DECREASED GRIP STRENGTH OF RIGHT HAND: ICD-10-CM

## 2017-02-15 DIAGNOSIS — R68.89 IMPAIRED FUNCTION OF UPPER EXTREMITY: Primary | ICD-10-CM

## 2017-02-15 DIAGNOSIS — Z78.9 IMPAIRED INSTRUMENTAL ACTIVITIES OF DAILY LIVING (IADL): ICD-10-CM

## 2017-02-15 PROCEDURE — 97530 THERAPEUTIC ACTIVITIES: CPT | Mod: PN

## 2017-02-15 NOTE — PROGRESS NOTES
Occupational Therapy   Treatment & POC update    Amy Orta   MRN: [id number]     Please see attached for POC Update.    MORIAH Molina, LOTR  2/13/2017

## 2017-02-15 NOTE — PROGRESS NOTES
"Occupational Therapy    Date:  02/15/2017  Start Time:  1203  Stop Time:  1300    TIMED  Procedure Time Min.   TherAct (4) Start:  Stop: 57   Total Timed Minutes:  57  Total Timed Units:  4  Total Untimed Units:  0  Charges Billed/# of units:  4    Progress/Current Status    Subjective:     Patient ID: Amy Orta is a 31 y.o. female.  Diagnosis:   1. Impaired function of upper extremity     2. Lack of coordination due to stroke     3. Decreased  strength of right hand     4. Impaired instrumental activities of daily living (IADL)       Pain: 0 /10  Pt ordered oval-8 splints (size 5 for RF and 6 for MF) on [employer]. They arrived this morning and pt is wearing them.  She says that opening the door & mixing a bottle were easier than typical this morning after donning oval-8 splints.    Objective:     Pt ed re: wear/care of oval 8 splints. She v/u.  Undo/ redo of 1 bolt/nut set on vertical surface in front of pt/ facing pt. Pt expressed difficulty with "redo" portion. Cues for keeping shoulder adducted, getting sufficient RD.  RD/UD with palm flat on table - gave pt a handout with outline of her hand in neutral, RD and UD. Perf'd AROM x5 in each direction, attempting to move through full movement.  RD off edge of table w/ gentle proximal stabilization, 3x7.  Shldr alphabet 3 sets, AROM.  Undo/redo of 1 bolt/nut set of vertical surface oriented to to the side.  Undo/redo of 1 bolt/nut set of vertical surface facing away from pt so she could manipulate it without seeing it.   In neutral forearm, perf'd thumb IP flex/ext 2x5 to move thumb tip from IF PIP to MP  In supination, perf'd thumb IP flex/ext 2x5 to move thumb tip from SF MP to distal cortez crease.  Holding cell phone with the R UE and stabilizing it on the table or her abdomen, using the R thumb to dial 5 numbers.    Assessment:     Improved coordination with oval-8 splints as pt not moving into PIP hyperextension on MF and RF. Pt had a more " difficult time than expected with shoulder alphabet. She can continue to benefit from skilled OT to increase R UE motor control and function.    Patient Education/Response:     HEP updated, handouts given. Pt v/u.    Plans and Goals:     Review HEP and progress as tolerated.    Aleta Nixon, MORIAH, LOTR  2/15/2017

## 2017-02-15 NOTE — PLAN OF CARE
Occupational Therapy   Treatment & Updated Plan of Care    Amy Orta   MRN: [id number]     Date: 2/13/2017  Start Time:  1100  Stop Time:  1200    TIMED  Procedure Time Min.   TherEx (1) Start:  Stop: 20   TherAct (3) Start:  Stop: 40   Total Timed Minutes:  60  Total Timed Units:  4  Total Untimed Units:  0  Charges Billed/# of units:  4    Today's Treatment:   S: Pt states she wants to continue working on her right arm so that she can have full use of it again. She is being d/c'd from ST. Please see below for pt's goals for continuing OT. She drove today.  O: Green t-band for shldr adduction, extension, ER, IR, 3x15 each. Measurements for update. Applied oval-8 orthosis to RF (size 5) and MF (size 6) and retested typing speed and GMC. There was no difference in time/ score but pt's fingers did not move into PIP hyperextension. OT recommended to pt that she get PIP hyperextension splints as it will help improve coordination in her fingers. She v/u.  A/P: Please see below.    OCCUPATIONAL THERAPY UPDATED PLAN OF TREATMENT    Patient name: Amy Orta  Onset Date:  5/19/2016  SOC Date:  5/23/2016    Primary Diagnosis:  L MCA CVA w/ R hemiparesis and aphasia  Treatment Diagnosis:   Encounter Diagnoses   Name Primary?    Impaired function of upper extremity Yes    Lack of coordination due to stroke     Decreased  strength of right hand     Impaired instrumental activities of daily living (IADL)      Certification Period:  2/7/2017 to 5/2/2017  Precautions:  universal  Visits from SOC:  61  Functional Level Prior to SOC:  Pt was indep w/ all activities prior to her CVA including working FT, doing cross-fit on a regular schedule and caring for 2 children while pregnant with the 3rd.    Updated Assessment:  Amy has only had 11 sessions since the last POC update due to the holidays and pt/children illness. Nonetheless, she has made some progress in fine-motor coordination. She has started back to  work on a very modified schedule, which has given her the ability to assess which work tasks are difficult and need to be addressed.:  GMC: Box & Block=40 (slight decline from 41)  FMC: 9HPT=75s (improved 82s)  Volin Grooved Pegboard: R=5 in 2 min (slight decline from 6 in 2 min)  Typing speed: 15WPM, 14 errors (69% accuracy)=10WPM adjusted (no change)    =13.67# (avg of 3, DAYLIN dynamometer, setting II)  Lat pinch=7#, 2pt pinch=3.5#, 3-jaw-velma=5#    Complicating her progress is Amy's continued difficulty with motor planning, weakness and increasing hyperextension in RF and MF PIP. However, with the holidays over, she appears ready to resume her focus on therapy and eager to return to her PLOF.    Amy's goals: be able to lift 20# with my right hand (to increase efficiency in changing Poppy's diapers); be able to  little flat suff like paper clips; improve writing, typing and R-handed use of the mouse, be able to write with a pencil, be able to pop a top from a test tub with the R thumb, be able to prep a slide for differentials.    Previous Goals Status:   Long Term Goals:  1) Pt will be able to write legibly and with relative ease with a Dr.  sized pen or a pen with a standard pencil . (progressing)  2) Pt will be able to maintain a tripod pinch on her pen/pencil for writing at least 1/2 page for improved control of pen. (progressing)  3) Pt will be able to fully abduct/adduct all digits for improved intrinsic control of hand. (progressing - still limited in RF)  4) Pt will demo improved motor planning for FM tasks as evidenced by a FTHUE score of 7/7 (MET), a 9HPT score less than 60s (progressing) and a Volin score of less than 3 min for the entire task (progressing).  5) Pt will demo sufficient FMC to be appropriate for return to work. (progressing)  6) Pt will demo R  of at least 40#.  3) Pt will be able to position Poppy's pacifier with the R UE without difficulty.  "(progressing)  4) Pt will be able to transition to a "pinch " on her pen and maintain control for writing at least 5 lines. (met - but with mod cues for positioning forearm)    Short Term Goals (to be achieved by end of cert. Period):   1) Pt will be able to work a television remote with her right hand in a timely manner. (progressing)   2) Pt will be able to work the computer mouse with her right hand. (progressing)  3) Pt will be able to type 20 WPM (progressing)    New Goals (to be achieved by end of cert. Period):  5/2/17  1) be able to lift 20# with the right hand (to increase efficiency in changing Poppy's diapers)  2) be able to pop a top from a test tub with the R thumb 5 times in less than 1 minute  3) Pt will be able to use mark UE to prep slide for differentials.  4) Pt will be able to type a 3-line text with the right thumb in less than 1 minute.  Continue previous goals      Reasons for Recertification of Therapy:  With the holidays over, Amy appears ready to resume her focus on therapy and eager to return to her PLOF. She has identified several goals related to work and home that she would like to be able to accomplish by continuing skilled OT. She has potential to continue improving functional use of the R UE with continued practice, focused strengthening and neuro re-education. Amy can improve function in the R UE sufficiently to allow for increased efficiency at work and at home.    Recommended Treatment Plan: Therapeutic Exercise, Functional Activities, Patient Education, Home Exercise Program and Sensory/Neuromuscular Reeducation, and any other approaches deemed necessary to help pt meet her goals.      Therapist's Name: MORIAH Molina, LOTR        Date: 2/13/2017      I CERTIFY THE NEED FOR THESE SERVICES FURNISHED UNDER THIS PLAN OF TREATMENT AND WHILE UNDER MY CARE    Physician's comments: " ________________________________________________________________________________________________________________________________________________      Physician's Name (print): ___________________________________    Physician's Signature: _______________________________________________    Date: ________________________

## 2017-03-08 ENCOUNTER — TELEPHONE (OUTPATIENT)
Dept: REHABILITATION | Facility: HOSPITAL | Age: 32
End: 2017-03-08

## 2017-03-15 ENCOUNTER — PROCEDURE VISIT (OUTPATIENT)
Dept: NEUROLOGY | Facility: CLINIC | Age: 32
End: 2017-03-15
Payer: COMMERCIAL

## 2017-03-15 VITALS
BODY MASS INDEX: 29.3 KG/M2 | RESPIRATION RATE: 20 BRPM | SYSTOLIC BLOOD PRESSURE: 110 MMHG | HEIGHT: 63 IN | DIASTOLIC BLOOD PRESSURE: 78 MMHG | HEART RATE: 68 BPM | WEIGHT: 165.38 LBS

## 2017-03-15 DIAGNOSIS — G43.719 CHRONIC MIGRAINE WITHOUT AURA, WITH INTRACTABLE MIGRAINE, SO STATED, WITHOUT MENTION OF STATUS MIGRAINOSUS: Primary | ICD-10-CM

## 2017-03-15 PROCEDURE — 64615 CHEMODENERV MUSC MIGRAINE: CPT | Mod: S$GLB,,, | Performed by: PSYCHIATRY & NEUROLOGY

## 2017-03-15 RX ORDER — OXYCODONE AND ACETAMINOPHEN 7.5; 325 MG/1; MG/1
1 TABLET ORAL EVERY 6 HOURS PRN
Qty: 20 TABLET | Refills: 0 | Status: SHIPPED | OUTPATIENT
Start: 2017-03-15 | End: 2017-05-03 | Stop reason: SDUPTHER

## 2017-03-15 NOTE — PROCEDURES
Procedures       BOTOX PROCEDURE    PROCEDURE PERFORMED: Botulinum toxin injection (77037)    CLINICAL INDICATION: G43.719 NDC: [id number]    A time out was conducted just before the start of the procedure to verify the correct patient and procedure, procedure location, and all relevant critical information.       This is the second Botox injections and I am aiming for at least 50%  improvement in the patient's symptoms. Frequency of treatment is every 3 months unless no response to the treatments, at which time we will discontinue the injections.     DESCRIPTION OF PROCEDURE: After obtaining informed consent and under aseptic technique, a total of 155 units of botulinum toxin type A were injected in the following muscles: Procerus 5 units,  5 units bilaterally, frontalis 20 units, temporalis 20 units bilaterally, occipitalis 15 units, upper cervical paraspinals 10 units bilaterally and trapezius 15 units bilaterally. The patient was given a total of 155 units in 31 sites.The patient tolerated the procedure well. There were no complications. The patient was given a prescription for repeat treatment in 3 months.     Unavoidable waste 45 units  TORADOL 30 MG IV INJECTION GIVEN TO BREAK A SEVERE ONGOING ATTACK  Suman Mcginnis M.D  Medical Director, Headache and Facial Pain  [facility]

## 2017-03-15 NOTE — MR AVS SNAPSHOT
[facility]  [address] [facility] [address] [place] [address]  Phone: [phone]  Fax: [phone]                  Amy Orta   3/15/2017 9:15 AM   Procedure visit    Description:  Female : 1985   Provider:  Sylvia Mcginnis MD   Department:  [facility] - Neurology           Reason for Visit     Botulinum Toxin Injection                To Do List           Future Appointments        Provider Department Dept Phone    3/22/2017 10:40 AM Cedrick Diaz DO [facility] [phone]    2017 9:00 AM Sylvia Mcginnis MD [facility] [phone]      Goals (5 Years of Data)     None      [facility]     [facility] Care [facility] -  Assistance  Registered nurses in the [facility] provide clinical advisement, health education, appointment booking, and other advisory services.  Call for this free service at [phone].             Medications           Message regarding Medications     Verify the changes and/or additions to your medication regime listed below are the same as discussed with your clinician today.  If any of these changes or additions are incorrect, please notify your healthcare provider.             Verify that the below list of medications is an accurate representation of the medications you are currently taking.  If none reported, the list may be blank. If incorrect, please contact your healthcare provider. Carry this list with you in case of emergency.           Current Medications     aspirin 325 MG tablet Take 325 mg by mouth once daily.    bisacodyl (DULCOLAX) 5 mg EC tablet Take 5 mg by mouth daily as needed for Constipation.    chlorproMAZINE (THORAZINE) 25 MG tablet Take 1 tablet (25 mg total) by mouth 3 (three) times daily.    clopidogrel (PLAVIX) 75 mg tablet Take 1 tablet (75 mg total) by mouth once daily.    isometheptene-apap-dichloralphenazone [id number] (MIDRIN) -325 mg per capsule Take 2 capsules  "at the onset of headache escalation and repeat in 4 hours if needed. Limit 4/day and 2 days per week    oxycodone-acetaminophen (PERCOCET) 7.5-325 mg per tablet Take 1 tablet by mouth every 6 (six) hours as needed for Pain.    PNV #35-IRON-FA #6-DHA ORAL Take 1 tablet by mouth once daily.    TRINTELLIX 10 mg Tab Take 1 tablet by mouth every evening.    zonisamide (ZONEGRAN) 25 MG Cap Taper off by taking 25 mg (3 tabs) po QHS for 1 week, then 2 tablets for 1 week, then 1 tablet for 1 week, then stop           Clinical Reference Information           Your Vitals Were     BP Pulse Resp Height Weight BMI    110/78 (BP Location: Left arm, Patient Position: Sitting, BP Method: Automatic) 68 20 5' 3" (1.6 m) 75 kg (165 lb 5.5 oz) 29.29 kg/m2      Blood Pressure          Most Recent Value    BP  110/78      Allergies as of 3/15/2017     No Known Allergies      Immunizations Administered on Date of Encounter - 3/15/2017     None      Language Assistance Services     ATTENTION: Language assistance services are available, free of charge. Please call [phone].      ATENCIÓN: Si davidla shanice, tiene a marie disposición servicios gratuitos de asistencia lingüística. Llame al [phone].     MICHAEL Ý: N?u b?n nói Ti?ng Vi?t, có các d?ch v? h? tr? ngôn ng? mi?n phí dành cho b?n. G?i s? [phone].         [facility] complies with applicable Federal civil rights laws and does not discriminate on the basis of race, color, national origin, age, disability, or sex.        "

## 2017-03-22 ENCOUNTER — OFFICE VISIT (OUTPATIENT)
Dept: NEUROLOGY | Facility: CLINIC | Age: 32
End: 2017-03-22
Payer: COMMERCIAL

## 2017-03-22 DIAGNOSIS — R20.2 PARESTHESIAS IN RIGHT HAND: ICD-10-CM

## 2017-03-22 DIAGNOSIS — G43.719 CHRONIC MIGRAINE WITHOUT AURA, WITH INTRACTABLE MIGRAINE, SO STATED, WITHOUT MENTION OF STATUS MIGRAINOSUS: ICD-10-CM

## 2017-03-22 DIAGNOSIS — M62.838 NIGHT MUSCLE SPASMS: ICD-10-CM

## 2017-03-22 DIAGNOSIS — Z86.73 HX OF ISCHEMIC LEFT MCA STROKE: Primary | ICD-10-CM

## 2017-03-22 DIAGNOSIS — T14.8XXA BRUISING: ICD-10-CM

## 2017-03-22 DIAGNOSIS — Z86.73 HISTORY OF ISCHEMIC LEFT MCA STROKE: ICD-10-CM

## 2017-03-22 PROCEDURE — 99999 PR PBB SHADOW E&M-EST. PATIENT-LVL I: CPT | Mod: PBBFAC,,, | Performed by: PSYCHIATRY & NEUROLOGY

## 2017-03-22 PROCEDURE — 99211 OFF/OP EST MAY X REQ PHY/QHP: CPT | Mod: PBBFAC,PN | Performed by: PSYCHIATRY & NEUROLOGY

## 2017-03-22 PROCEDURE — 99214 OFFICE O/P EST MOD 30 MIN: CPT | Mod: S$GLB,,, | Performed by: PSYCHIATRY & NEUROLOGY

## 2017-03-22 RX ORDER — FLUOXETINE HYDROCHLORIDE 20 MG/1
20 CAPSULE ORAL DAILY
Refills: 1 | COMMUNITY
Start: 2017-03-13 | End: 2017-06-13 | Stop reason: SDUPTHER

## 2017-03-22 RX ORDER — BACLOFEN 10 MG/1
10 TABLET ORAL NIGHTLY PRN
Qty: 60 TABLET | Refills: 5 | Status: SHIPPED | OUTPATIENT
Start: 2017-03-22 | End: 2017-11-11 | Stop reason: SDUPTHER

## 2017-03-22 NOTE — PROGRESS NOTES
Subjective:         Patient ID: Amy Orta is a 31 y.o.  female who presents for follow up of left MCA stroke and aphasia.    History of Present Illness    When stressed, she has more difficulty expressing herself.  Has speech strategies which help, easier to sing than to speak.     Right hand still numb, + cramps and muscle spasms, also with increased activity, heat.  Stressors will also trigger migraines.  Severity of migraines has decreased since starting Botox.      Developed bruising medial right knee, spontaneously    Review of patient's allergies indicates:  No Known Allergies  Current Outpatient Prescriptions   Medication Sig Dispense Refill    bisacodyl (DULCOLAX) 5 mg EC tablet Take 5 mg by mouth daily as needed for Constipation.      chlorproMAZINE (THORAZINE) 25 MG tablet Take 1 tablet (25 mg total) by mouth 3 (three) times daily. 90 tablet 11    clopidogrel (PLAVIX) 75 mg tablet Take 1 tablet (75 mg total) by mouth once daily. 30 tablet 11    fluoxetine (PROZAC) 20 MG capsule Take 20 mg by mouth once daily.  1    oxycodone-acetaminophen (PERCOCET) 7.5-325 mg per tablet Take 1 tablet by mouth every 6 (six) hours as needed for Pain. 20 tablet 0    baclofen (LIORESAL) 10 MG tablet Take 1 tablet (10 mg total) by mouth nightly as needed (may take second tablet as needed). 60 tablet 5     No current facility-administered medications for this visit.          Review of Systems  Review of Systems    Objective:        There were no vitals filed for this visit.  There is no height or weight on file to calculate BMI.  Neurologic Exam     Mental Status   Oriented to person, place, and time.   Registration: recalls 3 of 3 objects. Recall at 5 minutes: recalls 3 of 3 objects. Follows 3 step commands.   Attention: normal. Concentration: normal.   Speech: (Slow, decreased fluency)  Level of consciousness: alert  Knowledge: good.   Able to name object. Able to repeat. Normal comprehension.        Speech  slightly slurred especially when rapid.  Slightly decreased fluency but improved.     FAS testing 7,5,7  Categorical naming 16     Cranial Nerves     CN II   Visual fields full to confrontation.   Right visual field deficit: none  Left visual field deficit: none     CN III, IV, VI   Pupils are equal, round, and reactive to light.  Extraocular motions are normal.   CN III: no CN III palsy  CN VI: no CN VI palsy  Nystagmus: none   Diplopia: none  Ophthalmoparesis: none    CN V   Facial sensation intact.     CN VII   Facial expression full, symmetric.     CN VIII   CN VIII normal.     CN IX, X   Palate: symmetric    CN XI   CN XI normal.     CN XII   CN XII normal.     Motor Exam   Muscle bulk: normal  Overall muscle tone: normal  Right arm pronator drift: absent  Left arm pronator drift: absent    Strength   Strength 5/5 throughout.        No pronator drift.  Strength R  4+/5, otherwise strength is 5/5 throughout     Sensory Exam   Light touch normal.   Right arm proprioception: normal  Left arm proprioception: normal  Right arm pinprick: decreased from fingers  Left arm pinprick: normal       25% pinprick at fingertips RUE, some very subtle sensory ataxia in the fingers themselves but is able to touch stationary target with eyes closed after training     Gait, Coordination, and Reflexes     Gait  Gait: normal    Coordination   Romberg: negative  Finger to nose coordination: normal    Tremor   Resting tremor: absent  Intention tremor: absent  Action tremor: absent      Physical Exam   Constitutional: She is oriented to person, place, and time.   Eyes: EOM are normal. Pupils are equal, round, and reactive to light.   Neurological: She is oriented to person, place, and time. She has normal strength. She has a normal Finger-Nose-Finger Test and a normal Romberg Test. Gait normal.         Data Review:    Assessment:        1. Hx of ischemic left MCA stroke - s/p tPA 5/19/2016    2. Paresthesias in right hand    3.  Chronic migraine without aura, with intractable migraine, so stated, without mention of status migrainosus    4. Night muscle spasms    5. Bruising    6. History of ischemic left MCA stroke             Plan:         Problem List Items Addressed This Visit        Neuro    History of ischemic left MCA stroke    Current Assessment & Plan     She continues to improve.  Still fatigues very easily and has not yet been able to get back to work.  Recommended volunteering some time in low-stress environment            Other Visit Diagnoses     Hx of ischemic left MCA stroke - s/p tPA 5/19/2016    -  Primary    Paresthesias in right hand        Chronic migraine without aura, with intractable migraine, so stated, without mention of status migrainosus        Night muscle spasms        Bruising        Relevant Orders    CBC auto differential          Return in about 3 months (around 6/22/2017).       Thank you very much for the opportunity to assist in this patient's care.  If you have any questions or concerns, please do not hesitate to contact me at any time.     Sincerely,  Cedrick Diaz, DO

## 2017-03-23 DIAGNOSIS — Z86.73 CHRONIC ISCHEMIC LEFT MCA STROKE: Primary | ICD-10-CM

## 2017-03-23 DIAGNOSIS — G43.719 CHRONIC MIGRAINE WITHOUT AURA, WITH INTRACTABLE MIGRAINE, SO STATED, WITHOUT MENTION OF STATUS MIGRAINOSUS: ICD-10-CM

## 2017-03-24 ENCOUNTER — TELEPHONE (OUTPATIENT)
Dept: NEUROLOGY | Facility: CLINIC | Age: 32
End: 2017-03-24

## 2017-03-24 NOTE — TELEPHONE ENCOUNTER
----- Message from Mana Vital sent at 3/24/2017  8:42 AM CDT -----  Contact:  Krunal   Patient's  Krunal called to check on the status of paper work for the patient's disability  Please call  to advise.     Thanks

## 2017-03-24 NOTE — TELEPHONE ENCOUNTER
Spoke with patient's , Krunal. Informed him that Dr. Mcginnis wants the patient to have a Functional Capacity Evaluation so she can fill out the disability paperwork. Krunal said that he doesn't feel like the patient needs to go through that and have that done. He asked if her occupational therapist, Aleta Nixon would be able to fill it out since she has been dealing with the patient for over a year. Informed him that I was not sure if she would fill it out or not but I would try to get in touch with her and ask. If Aleta agrees to fill it out, I will fax it to her. He verbalized an understanding.

## 2017-03-24 NOTE — ASSESSMENT & PLAN NOTE
She continues to improve.  Still fatigues very easily and has not yet been able to get back to work.  Recommended volunteering some time in low-stress environment

## 2017-03-27 ENCOUNTER — TELEPHONE (OUTPATIENT)
Dept: NEUROLOGY | Facility: CLINIC | Age: 32
End: 2017-03-27

## 2017-03-27 NOTE — TELEPHONE ENCOUNTER
----- Message from Kasandra Moser sent at 3/27/2017  2:40 PM CDT -----  Contact: self   Pt is calling in to find out the paper work that needed to be filled out has been done and ready to be faxed out.     Please contact pt for more info at [phone].    Thanks

## 2017-03-27 NOTE — TELEPHONE ENCOUNTER
Have completed supplementary form for benefits, please copy and send to patient.  I actually also think a functional capacity eval would be helpful, as well as an occupational vocational evaluation that can work with her to set much more specific guidelines on how much she can work and in what capacity.  The form we have does not get into much detail regarding things like her aphasia and communication impairments.

## 2017-03-27 NOTE — TELEPHONE ENCOUNTER
Returned patient's call. Left a message stating that we are still waiting to hear back from her Occupational Therapist, Aleta Nixon.

## 2017-03-28 NOTE — TELEPHONE ENCOUNTER
----- Message from Luz Ott sent at 3/28/2017 10:27 AM CDT -----  Contact: Pt  Krunal can be reached at [phone]  Krunal is calling to check status of paperwork on disability...Please contact pt 2nd request.      Thank you!

## 2017-04-03 ENCOUNTER — DOCUMENTATION ONLY (OUTPATIENT)
Dept: REHABILITATION | Facility: HOSPITAL | Age: 32
End: 2017-04-03

## 2017-04-03 NOTE — DISCHARGE SUMMARY
REHAB SERVICES OUTPATIENT DISCHARGE SUMMARY  Occupational Therapy      Name:  Amy Orta  Date:  4/3/2017  Date of Evaluation:  5/31/2017  Physician:  Dr. Barros  Total # Of Visits:  62  Cancelled:  10  No Shows:  2  Diagnosis:  CVA    Physical/Functional Status:  At time of discharge, patient was indep w/ BADLs and IADLs, including driving and caring for her three children, though limited by decreased sensation and coordination in the R UE as well as communication deficits (please see ST). She was working a very limited schedule (a few hours at home one week followed by a few hours at work the next week). Please see plan of care from 2/13/2017 for more specific details.    The patient is to be discharged from our Therapy service for the following reason(s):  Pt decided that she was ready for a break from therapy on 3/8/2017. No goals from the newest plan of care have been met.    Degree of Goal Achievement:  No goals from the newest plan of care have been met.    Patient Education:  Please see EMR. Extensive ed with handouts given throughout therapy.    Discharge Plan:  Home Program; f/u w/ neurology as necessary.    MORIAH Molina LOTR  4/3/2017

## 2017-05-03 ENCOUNTER — TELEPHONE (OUTPATIENT)
Dept: NEUROLOGY | Facility: CLINIC | Age: 32
End: 2017-05-03

## 2017-05-03 RX ORDER — OXYCODONE AND ACETAMINOPHEN 7.5; 325 MG/1; MG/1
1 TABLET ORAL EVERY 6 HOURS PRN
Qty: 20 TABLET | Refills: 0 | Status: SHIPPED | OUTPATIENT
Start: 2017-05-03 | End: 2017-06-13 | Stop reason: SDUPTHER

## 2017-05-03 NOTE — TELEPHONE ENCOUNTER
----- Message from Jennie Ott sent at 5/3/2017  8:49 AM CDT -----  Contact: Krunal Orta (Spouse) [phone]  Krunal Orta (Spouse) [phone] stated patient down to one pain pill left, still have headaches and requesting medication to be called in.

## 2017-05-04 ENCOUNTER — TELEPHONE (OUTPATIENT)
Dept: NEUROLOGY | Facility: CLINIC | Age: 32
End: 2017-05-04

## 2017-05-04 NOTE — TELEPHONE ENCOUNTER
----- Message from Scarlet Sauceda sent at 5/3/2017  3:20 PM CDT -----  Contact: Patient's  Krunal  Patient's  Krunal called to verify if script, didn't know name of medication has been sent to [facility] in [place]? Please call back to advise at [phone]. Thanks,

## 2017-06-13 ENCOUNTER — OFFICE VISIT (OUTPATIENT)
Dept: NEUROLOGY | Facility: CLINIC | Age: 32
End: 2017-06-13
Payer: COMMERCIAL

## 2017-06-13 ENCOUNTER — PROCEDURE VISIT (OUTPATIENT)
Dept: NEUROLOGY | Facility: CLINIC | Age: 32
End: 2017-06-13
Payer: COMMERCIAL

## 2017-06-13 VITALS
RESPIRATION RATE: 18 BRPM | SYSTOLIC BLOOD PRESSURE: 120 MMHG | HEIGHT: 63 IN | BODY MASS INDEX: 28.05 KG/M2 | DIASTOLIC BLOOD PRESSURE: 79 MMHG | WEIGHT: 158.31 LBS | TEMPERATURE: 98 F | HEART RATE: 54 BPM

## 2017-06-13 VITALS
SYSTOLIC BLOOD PRESSURE: 120 MMHG | WEIGHT: 158.31 LBS | DIASTOLIC BLOOD PRESSURE: 79 MMHG | RESPIRATION RATE: 18 BRPM | HEART RATE: 54 BPM | BODY MASS INDEX: 28.05 KG/M2 | HEIGHT: 63 IN

## 2017-06-13 DIAGNOSIS — R20.2 PARESTHESIAS IN RIGHT HAND: ICD-10-CM

## 2017-06-13 DIAGNOSIS — G43.711 CHRONIC MIGRAINE WITHOUT AURA, WITH INTRACTABLE MIGRAINE, SO STATED, WITH STATUS MIGRAINOSUS: Primary | ICD-10-CM

## 2017-06-13 DIAGNOSIS — I69.320 APHASIA AS LATE EFFECT OF CEREBROVASCULAR ACCIDENT (CVA): ICD-10-CM

## 2017-06-13 DIAGNOSIS — Z86.73 HX OF ISCHEMIC LEFT MCA STROKE: Primary | ICD-10-CM

## 2017-06-13 DIAGNOSIS — G43.719 CHRONIC MIGRAINE WITHOUT AURA, WITH INTRACTABLE MIGRAINE, SO STATED, WITHOUT MENTION OF STATUS MIGRAINOSUS: ICD-10-CM

## 2017-06-13 PROCEDURE — 99214 OFFICE O/P EST MOD 30 MIN: CPT | Mod: S$GLB,,, | Performed by: PSYCHIATRY & NEUROLOGY

## 2017-06-13 PROCEDURE — 96372 THER/PROPH/DIAG INJ SC/IM: CPT | Mod: S$GLB,,, | Performed by: PSYCHIATRY & NEUROLOGY

## 2017-06-13 PROCEDURE — 99999 PR PBB SHADOW E&M-EST. PATIENT-LVL III: CPT | Mod: PBBFAC,,, | Performed by: PSYCHIATRY & NEUROLOGY

## 2017-06-13 RX ORDER — IBUPROFEN 800 MG/1
TABLET ORAL
Refills: 0 | COMMUNITY
Start: 2017-06-01 | End: 2017-11-29 | Stop reason: ALTCHOICE

## 2017-06-13 RX ORDER — KETOROLAC TROMETHAMINE 30 MG/ML
30 INJECTION, SOLUTION INTRAMUSCULAR; INTRAVENOUS
Status: COMPLETED | OUTPATIENT
Start: 2017-06-13 | End: 2017-06-13

## 2017-06-13 RX ORDER — FLUOXETINE 20 MG/1
TABLET ORAL
Refills: 3 | COMMUNITY
Start: 2017-06-01 | End: 2017-06-13

## 2017-06-13 RX ORDER — GABAPENTIN 300 MG/1
300 CAPSULE ORAL NIGHTLY
Qty: 90 CAPSULE | Refills: 5 | Status: SHIPPED | OUTPATIENT
Start: 2017-06-13 | End: 2018-01-11 | Stop reason: SDUPTHER

## 2017-06-13 RX ORDER — OXYCODONE AND ACETAMINOPHEN 7.5; 325 MG/1; MG/1
1 TABLET ORAL EVERY 6 HOURS PRN
Qty: 20 TABLET | Refills: 0 | Status: SHIPPED | OUTPATIENT
Start: 2017-06-13 | End: 2017-07-14 | Stop reason: SDUPTHER

## 2017-06-13 RX ORDER — FLUOXETINE HYDROCHLORIDE 20 MG/1
20 CAPSULE ORAL DAILY
Qty: 90 CAPSULE | Refills: 1 | Status: SHIPPED | OUTPATIENT
Start: 2017-06-13 | End: 2017-10-12

## 2017-06-13 RX ADMIN — KETOROLAC TROMETHAMINE 30 MG: 30 INJECTION, SOLUTION INTRAMUSCULAR; INTRAVENOUS at 10:06

## 2017-06-13 NOTE — PROCEDURES
Procedures       BOTOX PROCEDURE    PROCEDURE PERFORMED: Botulinum toxin injection (73706)    CLINICAL INDICATION: G43.719 NDC: 6775-9013-15    A time out was conducted just before the start of the procedure to verify the correct patient and procedure, procedure location, and all relevant critical information.       This is the third Botox injections and I am close to 50%  improvement in the patient's symptoms. Frequency of treatment is every 3 months unless no response to the treatments, at which time we will discontinue the injections.     DESCRIPTION OF PROCEDURE: After obtaining informed consent and under aseptic technique, a total of 155 units of botulinum toxin type A were injected in the following muscles: Procerus 5 units,  5 units bilaterally, frontalis 20 units, temporalis 20 units bilaterally, occipitalis 15 units, upper cervical paraspinals 10 units bilaterally and trapezius 15 units bilaterally. The patient was given a total of 155 units in 31 sites.The patient tolerated the procedure well. There were no complications. The patient was given a prescription for repeat treatment in 3 months.     Unavoidable waste 45 units  TORADOL 30 MG IV INJECTION GIVEN TO BREAK A SEVERE ONGOING ATTACK  Suman Mcginnis M.D  Medical Director, Headache and Facial Pain  [facility]

## 2017-06-13 NOTE — PROGRESS NOTES
Subjective:         Patient ID: Amy Orta is a 32 y.o.  female who presents for follow up of L MCA stroke, aphasia and migraine    Initial History of Present Illness:    Interval history since last visit:    Since our last visit, she decided to let her prozac rx run out.  Has been dealing with stressors, needed to get back on the Prozac.      Had seen an NP with psychiatry, does not wish to go back. Feels like her speech has plateaued.  Started going back to events with kids (baseball) or gym, seeing more people out socially.  With loud or busy activities will precipitate headche.  Talked about strategies to gradually immerse back into daily living, family, socializing, etc.       Review of patient's allergies indicates:  No Known Allergies  Current Outpatient Prescriptions   Medication Sig Dispense Refill    baclofen (LIORESAL) 10 MG tablet Take 1 tablet (10 mg total) by mouth nightly as needed (may take second tablet as needed). 60 tablet 5    bisacodyl (DULCOLAX) 5 mg EC tablet Take 5 mg by mouth daily as needed for Constipation.      fluoxetine (PROZAC) 20 MG capsule Take 1 capsule (20 mg total) by mouth once daily. 90 capsule 1    ibuprofen (ADVIL,MOTRIN) 800 MG tablet   0    oxycodone-acetaminophen (PERCOCET) 7.5-325 mg per tablet Take 1 tablet by mouth every 6 (six) hours as needed for Pain. 20 tablet 0    clopidogrel (PLAVIX) 75 mg tablet TAKE 1 TABLET(75 MG) BY MOUTH EVERY DAY 90 tablet 3    gabapentin (NEURONTIN) 300 MG capsule Take 1 capsule (300 mg total) by mouth every evening. May gradually increase to twice a day or three times a day as needed for facial and arm pain 90 capsule 5     No current facility-administered medications for this visit.          Review of Systems  Review of Systems    Objective:        Vitals:    06/13/17 1014   BP: 120/79   Pulse: (!) 54   Resp: 18   Temp: 98 °F (36.7 °C)     Body mass index is 28.04 kg/m².  Neurologic Exam     Mental Status   Oriented to  person, place, and time.   Attention: normal. Concentration: normal.   Level of consciousness: alert  Knowledge: good.   Word finding no significant change, her spontaneous speech is better than with FAS testing (5,5, stopped)     Motor Exam   Right arm pronator drift: absent  Left arm pronator drift: absentSlower fine movement of the right hand, unable to snap fingers     Sensory Exam   Painful paresthesias R face and arm       Physical Exam   Constitutional: She is oriented to person, place, and time.   Neurological: She is oriented to person, place, and time.         Data Review:    Assessment:        1. Hx of ischemic left MCA stroke - s/p tPA 5/19/2016    2. Paresthesias in right hand    3. Aphasia as late effect of cerebrovascular accident (CVA)    4. Chronic migraine without aura, with intractable migraine, so stated, without mention of status migrainosus       Clinically stable and doing well      Plan:         Problem List Items Addressed This Visit        Neuro    Hx of ischemic left MCA stroke - Primary    Relevant Orders    CBC auto differential    Comprehensive metabolic panel    Lipid panel    Paresthesias in right hand    Current Assessment & Plan     Start gabapentin, opiate medication for more severe pain PRN, intermittently         Aphasia as late effect of cerebrovascular accident (CVA)    Chronic migraine without aura, with intractable migraine, so stated, without mention of status migrainosus    Current Assessment & Plan     NSAIDS or Percocet PRN; triptans contraindicated           Other Visit Diagnoses    None.         Return in about 3 months (around 9/13/2017).       Thank you very much for the opportunity to assist in this patient's care.  If you have any questions or concerns, please do not hesitate to contact me at any time.     Sincerely,  Cedrick Diaz, DO

## 2017-06-19 RX ORDER — CLOPIDOGREL BISULFATE 75 MG/1
TABLET ORAL
Qty: 90 TABLET | Refills: 3 | Status: SHIPPED | OUTPATIENT
Start: 2017-06-19 | End: 2021-09-10 | Stop reason: SDUPTHER

## 2017-06-22 ENCOUNTER — TELEPHONE (OUTPATIENT)
Dept: NEUROLOGY | Facility: CLINIC | Age: 32
End: 2017-06-22

## 2017-06-26 PROBLEM — I69.320 APHASIA AS LATE EFFECT OF CEREBROVASCULAR ACCIDENT (CVA): Status: ACTIVE | Noted: 2017-06-26

## 2017-06-26 PROBLEM — R20.2 PARESTHESIAS IN RIGHT HAND: Status: ACTIVE | Noted: 2017-06-26

## 2017-06-26 PROBLEM — G43.719 CHRONIC MIGRAINE WITHOUT AURA, WITH INTRACTABLE MIGRAINE, SO STATED, WITHOUT MENTION OF STATUS MIGRAINOSUS: Status: ACTIVE | Noted: 2017-06-26

## 2017-07-13 ENCOUNTER — TELEPHONE (OUTPATIENT)
Dept: NEUROLOGY | Facility: CLINIC | Age: 32
End: 2017-07-13

## 2017-07-14 RX ORDER — OXYCODONE AND ACETAMINOPHEN 7.5; 325 MG/1; MG/1
1 TABLET ORAL EVERY 6 HOURS PRN
Qty: 20 TABLET | Refills: 0 | Status: SHIPPED | OUTPATIENT
Start: 2017-07-14 | End: 2017-08-09 | Stop reason: SDUPTHER

## 2017-07-14 RX ORDER — OXYCODONE AND ACETAMINOPHEN 7.5; 325 MG/1; MG/1
1 TABLET ORAL EVERY 6 HOURS PRN
Qty: 20 TABLET | Refills: 0 | Status: SHIPPED | OUTPATIENT
Start: 2017-07-14 | End: 2017-07-14 | Stop reason: SDUPTHER

## 2017-07-17 ENCOUNTER — PATIENT MESSAGE (OUTPATIENT)
Dept: NEUROLOGY | Facility: CLINIC | Age: 32
End: 2017-07-17

## 2017-07-18 ENCOUNTER — PATIENT MESSAGE (OUTPATIENT)
Dept: NEUROLOGY | Facility: CLINIC | Age: 32
End: 2017-07-18

## 2017-07-20 ENCOUNTER — PATIENT MESSAGE (OUTPATIENT)
Dept: NEUROLOGY | Facility: CLINIC | Age: 32
End: 2017-07-20

## 2017-07-25 NOTE — TELEPHONE ENCOUNTER
She will need to titrate up on the gabapentin, with sedation being the limiting factor.  If not already, gradually increase to one pill/cap 3 times a day.  If already at that dose, would continue titration up to 2 pills/caps three times a day

## 2017-08-09 RX ORDER — OXYCODONE AND ACETAMINOPHEN 7.5; 325 MG/1; MG/1
1 TABLET ORAL EVERY 6 HOURS PRN
Qty: 20 TABLET | Refills: 0 | Status: SHIPPED | OUTPATIENT
Start: 2017-08-09 | End: 2017-09-11 | Stop reason: SDUPTHER

## 2017-09-11 RX ORDER — OXYCODONE AND ACETAMINOPHEN 7.5; 325 MG/1; MG/1
1 TABLET ORAL EVERY 6 HOURS PRN
Qty: 20 TABLET | Refills: 0 | Status: SHIPPED | OUTPATIENT
Start: 2017-09-11 | End: 2017-10-03 | Stop reason: SDUPTHER

## 2017-09-18 ENCOUNTER — PROCEDURE VISIT (OUTPATIENT)
Dept: NEUROLOGY | Facility: CLINIC | Age: 32
End: 2017-09-18
Payer: COMMERCIAL

## 2017-09-18 VITALS
BODY MASS INDEX: 27.7 KG/M2 | HEART RATE: 111 BPM | HEIGHT: 63 IN | SYSTOLIC BLOOD PRESSURE: 122 MMHG | RESPIRATION RATE: 17 BRPM | WEIGHT: 156.31 LBS | DIASTOLIC BLOOD PRESSURE: 82 MMHG

## 2017-09-18 DIAGNOSIS — G43.719 CHRONIC MIGRAINE WITHOUT AURA, WITH INTRACTABLE MIGRAINE, SO STATED, WITHOUT MENTION OF STATUS MIGRAINOSUS: Primary | ICD-10-CM

## 2017-09-18 PROCEDURE — 96372 THER/PROPH/DIAG INJ SC/IM: CPT | Mod: 59,S$GLB,, | Performed by: PSYCHIATRY & NEUROLOGY

## 2017-09-18 PROCEDURE — 64615 CHEMODENERV MUSC MIGRAINE: CPT | Mod: S$GLB,,, | Performed by: PSYCHIATRY & NEUROLOGY

## 2017-09-18 PROCEDURE — 96374 THER/PROPH/DIAG INJ IV PUSH: CPT | Mod: 59,S$GLB,, | Performed by: PSYCHIATRY & NEUROLOGY

## 2017-09-18 RX ORDER — PROMETHAZINE HYDROCHLORIDE 25 MG/1
25 TABLET ORAL 2 TIMES DAILY PRN
Qty: 12 TABLET | Refills: 3 | Status: SHIPPED | OUTPATIENT
Start: 2017-09-18 | End: 2021-03-02

## 2017-09-18 RX ORDER — KETOROLAC TROMETHAMINE 30 MG/ML
30 INJECTION, SOLUTION INTRAMUSCULAR; INTRAVENOUS
Status: COMPLETED | OUTPATIENT
Start: 2017-09-18 | End: 2017-09-18

## 2017-09-18 RX ORDER — ONDANSETRON 4 MG/1
4 TABLET, ORALLY DISINTEGRATING ORAL EVERY 8 HOURS PRN
Qty: 10 TABLET | Refills: 3 | Status: SHIPPED | OUTPATIENT
Start: 2017-09-18 | End: 2021-08-27 | Stop reason: SDUPTHER

## 2017-09-18 RX ORDER — ONDANSETRON 2 MG/ML
4 INJECTION INTRAMUSCULAR; INTRAVENOUS
Status: COMPLETED | OUTPATIENT
Start: 2017-09-18 | End: 2017-09-18

## 2017-09-18 RX ADMIN — ONDANSETRON 4 MG: 2 INJECTION INTRAMUSCULAR; INTRAVENOUS at 11:09

## 2017-09-18 RX ADMIN — KETOROLAC TROMETHAMINE 30 MG: 30 INJECTION, SOLUTION INTRAMUSCULAR; INTRAVENOUS at 11:09

## 2017-09-18 NOTE — PROCEDURES
Procedures   and Follow Up  The Botox injections have achieved well over 50%  improvement in the patient's symptoms. Migraines have been reduced at least 7 days per month and the number of cumulative hours suffering with headaches has been reduced at least 100 total hours per month. Today she does have a headache indicating that the Botox has worn off. Frequency of treatment is every 3 months unless no response to the treatments, at which time we will discontinue the injections.        ROS: Positive for photophobia, phonophobia, nausea, with the attacks     Past Medical History:   Diagnosis Date    Encounter for blood transfusion     Migraines     Stroke 2016    received IV tPA.  Stroke following        Current Outpatient Prescriptions   Medication Sig    baclofen (LIORESAL) 10 MG tablet Take 1 tablet (10 mg total) by mouth nightly as needed (may take second tablet as needed).    bisacodyl (DULCOLAX) 5 mg EC tablet Take 5 mg by mouth daily as needed for Constipation.    clopidogrel (PLAVIX) 75 mg tablet TAKE 1 TABLET(75 MG) BY MOUTH EVERY DAY    fluoxetine (PROZAC) 20 MG capsule Take 1 capsule (20 mg total) by mouth once daily.    gabapentin (NEURONTIN) 300 MG capsule Take 1 capsule (300 mg total) by mouth every evening. May gradually increase to twice a day or three times a day as needed for facial and arm pain    ibuprofen (ADVIL,MOTRIN) 800 MG tablet     oxycodone-acetaminophen (PERCOCET) 7.5-325 mg per tablet Take 1 tablet by mouth every 6 (six) hours as needed for Pain.     Current Facility-Administered Medications   Medication    [COMPLETED] onabotulinumtoxina injection 200 Units     Review of patient's allergies indicates:  No Known Allergies    Alert and fully oriented   Lungs CTA  Heart RRR  CN II-XII intact  Motor normal bulk and tone, symmetric strength  Coordination intact FTN  Sensory in tact to LT  Gait: normal, pace and base     Data review:    Lab Results   Component Value Date      09/20/2016    K 3.3 (L) 09/20/2016     (H) 09/20/2016    CO2 19 (L) 09/20/2016    BUN 12 09/20/2016    CREATININE 0.8 09/20/2016    GLU 95 09/20/2016    HGBA1C 5.0 09/20/2016    AST 17 09/20/2016    ALT 15 09/20/2016    ALBUMIN 3.6 09/20/2016    PROT 6.8 09/20/2016    BILITOT 0.4 09/20/2016    CHOL 126 09/20/2016    HDL 46 09/20/2016    LDLCALC 70.6 09/20/2016    TRIG 47 09/20/2016       Lab Results   Component Value Date    WBC 10.05 09/20/2016    HGB 12.0 09/20/2016    HCT 35.4 (L) 09/20/2016    MCV 87 09/20/2016     09/20/2016       Lab Results   Component Value Date    TSH 6.243 (H) 09/20/2016         A/P:     Chronic Migraine responding to Botox as expected. Continue treatment until patient in true remission, meaning that the patient stays headache free when Botox wears off in 10 to 12 weeks. That will be the time to stop.  Encouraged the patient to comply with with early acute intervention for escalations    The patient complained of severe headache today, as the Botox wore off a couple of weeks ago. She admitted to nausea, sharp pain in the right orbital, frontal and temporal region. I injected 30 mg of IV Toradol and 4 mg of IV Zofran slow push.      BOTOX PROCEDURE    PROCEDURE PERFORMED: Botulinum toxin injection (66126)    CLINICAL INDICATION: G43.719 NDC: 3951-3941-33    A time out was conducted just before the start of the procedure to verify the correct patient and procedure, procedure location, and all relevant critical information.       DESCRIPTION OF PROCEDURE: After obtaining informed consent and under aseptic technique, a total of 155 units of botulinum toxin type A were injected in the following muscles: Procerus 5 units,  5 units bilaterally, frontalis 20 units, temporalis 20 units bilaterally, occipitalis 15 units, upper cervical paraspinals 10 units bilaterally and trapezius 15 units bilaterally. The patient was given a total of 155 units in 31 sites.The  patient tolerated the procedure well. There were no complications. The patient was given a prescription for repeat treatment in 3 months.     Unavoidable waste 45 units            Suman Mcginnis M.D  Medical Director, Headache and Facial Pain  [facility]

## 2017-09-25 ENCOUNTER — PATIENT MESSAGE (OUTPATIENT)
Dept: NEUROLOGY | Facility: CLINIC | Age: 32
End: 2017-09-25

## 2017-09-26 ENCOUNTER — TELEPHONE (OUTPATIENT)
Dept: NEUROLOGY | Facility: CLINIC | Age: 32
End: 2017-09-26

## 2017-09-26 RX ORDER — TOPIRAMATE 50 MG/1
TABLET, FILM COATED ORAL
Qty: 60 TABLET | Refills: 11 | Status: SHIPPED | OUTPATIENT
Start: 2017-09-26 | End: 2021-03-02

## 2017-10-03 ENCOUNTER — PATIENT MESSAGE (OUTPATIENT)
Dept: NEUROLOGY | Facility: CLINIC | Age: 32
End: 2017-10-03

## 2017-10-03 RX ORDER — OXYCODONE AND ACETAMINOPHEN 7.5; 325 MG/1; MG/1
1 TABLET ORAL EVERY 6 HOURS PRN
Qty: 20 TABLET | Refills: 0 | Status: SHIPPED | OUTPATIENT
Start: 2017-10-03 | End: 2017-11-29 | Stop reason: ALTCHOICE

## 2017-10-03 NOTE — TELEPHONE ENCOUNTER
It seems like she is needing the percocet for her headaches more frequently lately - I'd like to get her back in to see Dr. Mcginnis soon to make sure she is not developing rebound headaches, or if there is something else we can do to treat the headaches.      Be cautious about the frequency of use of pain medicine - more than 2 or 3 days a week can lead to rebound headaches.

## 2017-10-04 ENCOUNTER — PATIENT MESSAGE (OUTPATIENT)
Dept: NEUROLOGY | Facility: CLINIC | Age: 32
End: 2017-10-04

## 2017-10-04 RX ORDER — METHYLPREDNISOLONE 4 MG/1
TABLET ORAL
Qty: 1 PACKAGE | Refills: 0 | Status: SHIPPED | OUTPATIENT
Start: 2017-10-04 | End: 2017-10-12

## 2017-10-12 ENCOUNTER — TELEPHONE (OUTPATIENT)
Dept: NEUROLOGY | Facility: CLINIC | Age: 32
End: 2017-10-12

## 2017-10-12 ENCOUNTER — OFFICE VISIT (OUTPATIENT)
Dept: NEUROLOGY | Facility: CLINIC | Age: 32
End: 2017-10-12
Payer: COMMERCIAL

## 2017-10-12 VITALS
HEART RATE: 62 BPM | RESPIRATION RATE: 16 BRPM | HEIGHT: 63 IN | BODY MASS INDEX: 27.57 KG/M2 | DIASTOLIC BLOOD PRESSURE: 76 MMHG | SYSTOLIC BLOOD PRESSURE: 112 MMHG | WEIGHT: 155.63 LBS

## 2017-10-12 DIAGNOSIS — Z86.73 HX OF ISCHEMIC LEFT MCA STROKE: ICD-10-CM

## 2017-10-12 DIAGNOSIS — R13.10 DYSPHAGIA, UNSPECIFIED TYPE: Primary | ICD-10-CM

## 2017-10-12 DIAGNOSIS — G44.40 REBOUND HEADACHE: ICD-10-CM

## 2017-10-12 DIAGNOSIS — G43.711 CHRONIC MIGRAINE WITHOUT AURA, WITH INTRACTABLE MIGRAINE, SO STATED, WITH STATUS MIGRAINOSUS: Primary | ICD-10-CM

## 2017-10-12 DIAGNOSIS — I69.320 APHASIA AS LATE EFFECT OF CEREBROVASCULAR ACCIDENT (CVA): ICD-10-CM

## 2017-10-12 DIAGNOSIS — R20.2 PARESTHESIAS IN RIGHT HAND: ICD-10-CM

## 2017-10-12 PROCEDURE — 99999 PR PBB SHADOW E&M-EST. PATIENT-LVL IV: CPT | Mod: PBBFAC,,, | Performed by: PSYCHIATRY & NEUROLOGY

## 2017-10-12 PROCEDURE — 99214 OFFICE O/P EST MOD 30 MIN: CPT | Mod: S$GLB,,, | Performed by: PSYCHIATRY & NEUROLOGY

## 2017-10-12 RX ORDER — ESCITALOPRAM OXALATE 20 MG/1
20 TABLET ORAL
Refills: 1 | COMMUNITY
Start: 2017-09-12 | End: 2021-03-02

## 2017-10-12 RX ORDER — PREDNISONE 10 MG/1
TABLET ORAL
Qty: 35 TABLET | Refills: 0 | Status: SHIPPED | OUTPATIENT
Start: 2017-10-12 | End: 2017-11-29 | Stop reason: ALTCHOICE

## 2017-10-12 RX ORDER — BENZONATATE 100 MG/1
100 CAPSULE ORAL
COMMUNITY
Start: 2017-06-27 | End: 2018-06-27

## 2017-10-12 NOTE — TELEPHONE ENCOUNTER
The pt came to clinic today for visit with Dr. Diaz. Reports she has had a headache now for 16 days with at a #7 pain level with no relief. Reports her shoulders are very tense and she has difficulty swallowing when her headache is severe. Botox did not help last time, made worse.Please advise.

## 2017-10-12 NOTE — PATIENT INSTRUCTIONS
First step will be to keep a diary or journal of your headaches.  Try to include as much detail as possible - any activity or food which may trigger them, how much / little sleep you had, stressors, etc.  Figure out how often you get an aura and how long before the migraine becomes severe, where in the head it locates, etc. Write down how many days you have the headache, how long they last, and if medicines help or not. We can use this to track your progress over time, avoid triggers or exacerbating situations in the future, and make adjustments to your treatment plan.    I think you may have gotten into a cycle of rebound headaches - excedrin migraine is famous for this.  We need to try to break the cycle.     I would like you to take prednisone 50 mg (Five 10 mg pills) each morning with food for the next 5 days, then each day after that take one less pill (4, then the next day 3, then 2, then 1, then stop)  NO caffeine, no percocet, excedrin, ibuprofen, etc.      You can also take nausea medicine such as compazine or phenergan ASAP with the migraine.  It may make you drowsy.    For migraines that are either infrequent but very severe, or frequent (typically more than 10-15 headache days a month) daily preventative medications can be helpful in reducing migraine frequency.    Check prescription prices near you on [employer]    Stress reduction and relaxation techniques including meditation and yoga, regular physical exercise, counseling or therapy are extremely important in reducing the impact of your migraines.  A regular sleep schedule and a healthy diet are also extremely important factors.    Vitamin D3 (2278-7054) units a day, Feverfew, Butterbur, and magnesium supplements may have some benefit in migraine prevention and reducing intensity.

## 2017-10-12 NOTE — PROGRESS NOTES
Subjective:         Patient ID: Amy Orta is a 32 y.o.  female who presents for follow up of L MCA stroke, aphasia and migraine    Initial / Last History of Present Illness:    Since our last visit, she decided to let her prozac rx run out.  Has been dealing with stressors, needed to get back on the Prozac.       Had seen an NP with psychiatry, does not wish to go back. Feels like her speech has plateaued.  Started going back to events with kids (baseball) or gym, seeing more people out socially.  With loud or busy activities will precipitate headche.  Talked about strategies to gradually immerse back into daily living, family, socializing, etc.     Interval history since last visit:    Has had headache on a daily basis since her last botox series. Has not seemed to help.  Trying to exercise more often but headaches may be limiting her activity.      Speech is stable.     Pain in the right arm remains stable, chronic, 3/10 overall.  On occasion, with activity may be more intense. Usually however she is not thinking about it. Just started Topamax 2 weeks ago; perhaps has noticed some mild language issues with this. Taking the  mg BID seems to have helped a little.      Has been using excedrin migraine two every 6 hours; in an average day may take 4 or 6 pills.  Has been taking this often for the past 3 weeks. Prior to that had been using more of the percocet.     She completed the medrol dosepak after rx 10/4, did not take pain meds but was still drinking caffeine.      Review of patient's allergies indicates:  No Known Allergies  Current Outpatient Prescriptions   Medication Sig Dispense Refill    baclofen (LIORESAL) 10 MG tablet Take 1 tablet (10 mg total) by mouth nightly as needed (may take second tablet as needed). 60 tablet 5    benzonatate (TESSALON) 100 MG capsule Take 100 mg by mouth.      bisacodyl (DULCOLAX) 5 mg EC tablet Take 5 mg by mouth daily as needed for Constipation.       clopidogrel (PLAVIX) 75 mg tablet TAKE 1 TABLET(75 MG) BY MOUTH EVERY DAY 90 tablet 3    escitalopram oxalate (LEXAPRO) 20 MG tablet 20 mg. Pt taking 30 mg daily  1    gabapentin (NEURONTIN) 300 MG capsule Take 1 capsule (300 mg total) by mouth every evening. May gradually increase to twice a day or three times a day as needed for facial and arm pain 90 capsule 5    ibuprofen (ADVIL,MOTRIN) 800 MG tablet   0    ondansetron (ZOFRAN-ODT) 4 MG TbDL Take 1 tablet (4 mg total) by mouth every 8 (eight) hours as needed. 10 tablet 3    oxycodone-acetaminophen (PERCOCET) 7.5-325 mg per tablet Take 1 tablet by mouth every 6 (six) hours as needed for Pain. 20 tablet 0    promethazine (PHENERGAN) 25 MG tablet Take 1 tablet (25 mg total) by mouth 2 (two) times daily as needed for Nausea. 12 tablet 3    topiramate (TOPAMAX) 50 MG tablet Half tab PO qHS x 1 week, then half tab PO BID x 1 week, then half tab PO in AM and full pill in PM x 7 days, then 1 tab BID 60 tablet 11    predniSONE (DELTASONE) 10 MG tablet Take 5 pills each morning with food daily for 5 days, then take 4 pills the next day, then 3 pills the next day, then 2 pills the next day, then 1 pills the next day then stop 35 tablet 0     No current facility-administered medications for this visit.          Review of Systems  Review of Systems    Objective:        Vitals:    10/12/17 1349   BP: 112/76   Pulse: 62   Resp: 16     Body mass index is 27.57 kg/m².  Neurologic Exam     Mental Status   Oriented to person, place, and time.   Attention: normal. Concentration: normal.   Level of consciousness: alert  Knowledge: good.   Unable to name object. Normal comprehension.   Aphasia remains unchanged     Motor Exam   Right arm pronator drift: absent  Left arm pronator drift: absentSlower fine movement of the right hand, now she can snap fingers     Sensory Exam   Painful paresthesias R face and arm       Physical Exam   Constitutional: She is oriented to person,  place, and time.   Neurological: She is oriented to person, place, and time.         Data Review:    Assessment:        1. Chronic migraine without aura, with intractable migraine, so stated, with status migrainosus    2. Hx of ischemic left MCA stroke - s/p tPA 5/19/2016    3. Rebound headache    4. Paresthesias in right hand    5. Aphasia as late effect of cerebrovascular accident (CVA)           Plan:         Problem List Items Addressed This Visit        Neuro    Hx of ischemic left MCA stroke    Aphasia as late effect of cerebrovascular accident (CVA)       Other    Paresthesias in right hand      Other Visit Diagnoses     Chronic migraine without aura, with intractable migraine, so stated, with status migrainosus    -  Primary    Rebound headache          First step will be to keep a diary or journal of your headaches.  Try to include as much detail as possible - any activity or food which may trigger them, how much / little sleep you had, stressors, etc.  Figure out how often you get an aura and how long before the migraine becomes severe, where in the head it locates, etc. Write down how many days you have the headache, how long they last, and if medicines help or not. We can use this to track your progress over time, avoid triggers or exacerbating situations in the future, and make adjustments to your treatment plan.    I think you may have gotten into a cycle of rebound headaches - excedrin migraine is famous for this.  We need to try to break the cycle.     I would like you to take prednisone 50 mg (Five 10 mg pills) each morning with food for the next 5 days, then each day after that take one less pill (4, then the next day 3, then 2, then 1, then stop)  NO caffeine, no percocet, excedrin, ibuprofen, etc.      You can also take nausea medicine such as compazine or phenergan ASAP with the migraine.  It may make you drowsy.    For migraines that are either infrequent but very severe, or frequent (typically  more than 10-15 headache days a month) daily preventative medications can be helpful in reducing migraine frequency.    Check prescription prices near you on [employer] phone samuel    Stress reduction and relaxation techniques including meditation and yoga, regular physical exercise, counseling or therapy are extremely important in reducing the impact of your migraines.  A regular sleep schedule and a healthy diet are also extremely important factors.    Vitamin D3 ([id number]) units a day, Feverfew, Butterbur, and magnesium supplements may have some benefit in migraine prevention and reducing intensity.       Return in about 3 months (around 1/12/2018).       Thank you very much for the opportunity to assist in this patient's care.  If you have any questions or concerns, please do not hesitate to contact me at any time.     Sincerely,  Cedrick Diaz, DO

## 2017-10-13 ENCOUNTER — PATIENT MESSAGE (OUTPATIENT)
Dept: NEUROLOGY | Facility: CLINIC | Age: 32
End: 2017-10-13

## 2017-10-19 ENCOUNTER — PATIENT MESSAGE (OUTPATIENT)
Dept: NEUROLOGY | Facility: CLINIC | Age: 32
End: 2017-10-19

## 2017-11-01 ENCOUNTER — HOSPITAL ENCOUNTER (OUTPATIENT)
Dept: RADIOLOGY | Facility: HOSPITAL | Age: 32
Discharge: HOME | End: 2017-11-01
Attending: PSYCHIATRY & NEUROLOGY
Payer: COMMERCIAL

## 2017-11-01 DIAGNOSIS — R13.13 PHARYNGEAL DYSPHAGIA: ICD-10-CM

## 2017-11-01 PROCEDURE — G8996 SWALLOW CURRENT STATUS: HCPCS | Mod: CI

## 2017-11-01 PROCEDURE — 92611 MOTION FLUOROSCOPY/SWALLOW: CPT

## 2017-11-01 PROCEDURE — G8998 SWALLOW D/C STATUS: HCPCS | Mod: CH

## 2017-11-01 PROCEDURE — 74230 X-RAY XM SWLNG FUNCJ C+: CPT | Mod: TC

## 2017-11-01 PROCEDURE — G8997 SWALLOW GOAL STATUS: HCPCS | Mod: CH

## 2017-11-01 PROCEDURE — 74230 X-RAY XM SWLNG FUNCJ C+: CPT | Mod: 26,,, | Performed by: RADIOLOGY

## 2017-11-01 NOTE — PROGRESS NOTES
Modified Barium Swallowing Study    Past Medical History:   Diagnosis Date    Encounter for blood transfusion     Migraines     Stroke 2016    received IV tPA.  Stroke following      Past Surgical History:   Procedure Laterality Date     SECTION      CHOLECYSTECTOMY N/A     DILATION AND CURETTAGE OF UTERUS       Pt was seen today due to complaint of globus sensation, especially on meat & pills.  She reported it began after botox injections for migraines.  She had a CVA 1 1/2 yrs ago, with aphasia, dysarthria & dysphagia, but was noted to have only mild dysarthria, mild language deficits & no oral dysphagia at this time.  She admitted to heartburn requiring medication at least once per week.  She denied neck/throat surgery, or other neuro or pulmonary diagnoses.   Today, pt was found to have WNL oral stages, & WFL pharyngeal stage except for no epiglottic inversion.  Due to this abnormality, the barium pill she took with a swallow of thin barium lodged in her valleculae.  It was propelled out with an effortful, dry swallow.  A view of the esophagus at the end of the study revealed slightly delayed emptying.   Recommend no diet changes.  Pt was instructed to take pills in pudding or masticated food, followed by a swallow of liquid.  She may benefit from GI consult due to reported frequent heartburn.   G Codes Swallowing Current CI     Goal CH     D/C CI     17 1010   Speech Time Calculation   Speech Start Time 1010   Speech Stop Time 1026   Speech Total (min) 16 min   General Information   SLP Treatment Date 17   Referring Physician Ras   General Observations alert, cooperative, very mild dysarthria   Pertinent History of Current Problem L MCA CVA 2016 with aphasia, dysarthria & dysphagia..  Current c/o globus sensation, especially on meat   Subjective   Patient states It feels like meat gets stuck here (pointed to throat).  Sometimes a pill.  This started after   Ras did botox injections for migranes.   Oral Musculature Evaluation   Oral Musculature WFL   Dentition present and adequate   Mucosal Quality good   Mandibular Strength and Mobility WNL   Oral Labial Strength and Mobility WNL   Lingual Strength and Mobility WNL   Velar Elevation WNL   Buccal Strength and Mobility WNL   Voice Prior to PO Intake clear       MBS Eval: Thin Liquid Trial   Mode of Presentation, Thin Liquid spoon;fed by clinician;cup;straw;self fed   Volume of Thin Liquid Presented tsp, cup sip, straw sip   Oral Prep/Phase, Thin Liquid WNL   Pharyngeal Phase, Thin Liquid WFL;no epiglottic inversion   Rosenbeck's 8-Point Penetration-Aspiration Scale, Thin Liquids (1) Material does not enter airway.   Esophageal Phase, Thin (WFL)   Diagnostic Statement WFL oropharygneal stages   Additional Comments barium pill taken with swallow of liquid: see notes       MBS Eval: Pureed Trial   Mode of Presentation, Puree spoon   Volume of Puree Presented tsp   Oral Prep/Phase, Puree WNL   Pharyngeal Phase, Puree no epiglottic inversion;WFL   Rosenbeck's 8-Point Penetration-Aspiration Scale, Pureed (1) Material does not enter airway.   Diagnostic Statement WFL oropharygneal stage       MBS Eval: Soft Solid Trial   Mode of Presentation, Semisolid spoon   Volume of Semisolid Food Presented tsp peaches in thin barium   Oral Prep/Phase, Semisolid WNL   Pharyngeal Phase, Semisolid WFL;no epiglottic inversion   Rosenbeck's 8-Point Penetration-Aspiration Scale, Semisolid (1) Material does not enter airway.   Diagnostic Statement WFL oropharyngeal stages       MBS Eval: Solid Food Texture Trial   Mode of Presentation, Solid spoon   Volume of Solid Food Presented 1/2 cookie with barium paste   Oral Prep/Phase, Solid WNL   Pharyngeal Phase, Solid WFL;no epiglottic inversion   Rosenbeck's 8-Point Penetration-Aspiration Scale, Solid (1) Material does not enter airway.   Diagnostic Statement WFl oropharyngeal stages    Recommendations   Solid Diet Level Regular   Liquid Diet Level Thin   Additional Recommendations pills whole in pudding or masticated food   Plan   Demonstrates Need for Referral to Another Service GI   SLP Follow-up   SLP Follow-up? No   Treatment/Billable Minutes   Motion Fluoro Swallow, Cine/Vid 16   Total Time 16

## 2017-11-11 RX ORDER — BACLOFEN 10 MG/1
TABLET ORAL
Qty: 60 TABLET | Refills: 0 | Status: SHIPPED | OUTPATIENT
Start: 2017-11-11 | End: 2018-04-12 | Stop reason: SDUPTHER

## 2017-11-29 ENCOUNTER — PATIENT MESSAGE (OUTPATIENT)
Dept: NEUROLOGY | Facility: CLINIC | Age: 32
End: 2017-11-29

## 2017-11-29 ENCOUNTER — PROCEDURE VISIT (OUTPATIENT)
Dept: NEUROLOGY | Facility: CLINIC | Age: 32
End: 2017-11-29
Payer: COMMERCIAL

## 2017-11-29 VITALS
SYSTOLIC BLOOD PRESSURE: 101 MMHG | BODY MASS INDEX: 27.8 KG/M2 | DIASTOLIC BLOOD PRESSURE: 54 MMHG | HEIGHT: 63 IN | WEIGHT: 156.88 LBS | RESPIRATION RATE: 16 BRPM | HEART RATE: 66 BPM

## 2017-11-29 DIAGNOSIS — R20.2 PARESTHESIAS IN RIGHT HAND: ICD-10-CM

## 2017-11-29 DIAGNOSIS — I69.320 APHASIA AS LATE EFFECT OF CEREBROVASCULAR ACCIDENT (CVA): ICD-10-CM

## 2017-11-29 DIAGNOSIS — R13.10 DYSPHAGIA, UNSPECIFIED TYPE: ICD-10-CM

## 2017-11-29 DIAGNOSIS — Z86.73 HX OF ISCHEMIC LEFT MCA STROKE: Primary | ICD-10-CM

## 2017-11-29 DIAGNOSIS — G43.711 CHRONIC MIGRAINE WITHOUT AURA, WITH INTRACTABLE MIGRAINE, SO STATED, WITH STATUS MIGRAINOSUS: ICD-10-CM

## 2017-11-29 PROCEDURE — 99214 OFFICE O/P EST MOD 30 MIN: CPT | Mod: S$GLB,,, | Performed by: PSYCHIATRY & NEUROLOGY

## 2017-11-29 RX ORDER — MEMANTINE HYDROCHLORIDE 5 MG-10 MG
KIT ORAL
Qty: 42 TABLET | Refills: 0 | Status: SHIPPED | OUTPATIENT
Start: 2017-11-29 | End: 2018-11-29

## 2017-11-29 RX ORDER — MEMANTINE HYDROCHLORIDE 5 MG/1
5 TABLET ORAL 2 TIMES DAILY
Qty: 60 TABLET | Refills: 11 | Status: SHIPPED | OUTPATIENT
Start: 2017-11-29 | End: 2021-03-02

## 2017-11-29 RX ORDER — BACLOFEN 10 MG/1
TABLET ORAL
COMMUNITY
Start: 2017-11-11 | End: 2021-03-02

## 2017-11-29 RX ORDER — TOPIRAMATE 50 MG/1
TABLET, FILM COATED ORAL
COMMUNITY
Start: 2017-09-26 | End: 2021-03-02

## 2017-11-29 NOTE — PROCEDURES
Procedures NO PROCEDURES TODAY    FOLLOW UP VISIT  The patient comes accompanied by her . She has decided not to proceed with Botox today based on the lack of efficacy of last treatment. She had a severe headache and continued to have one for over 20 days. She was then treated with Prednisone, elimination of caffeine and OTC medications to avoid MOH. She still having headaches and in addition there has been a deterioration of her speech, memory, thinking skills. She is not receiving formal therapy anymore.       ROS: Positive for photophobia, phonophobia, nausea, insomnia with attacks     Past Medical History:   Diagnosis Date    Encounter for blood transfusion     Migraines     Stroke: Left ischemic MCA 2016    received IV tPA.  Stroke following          Current Outpatient Prescriptions   Medication Sig    baclofen (LIORESAL) 10 MG tablet TAKE 1 TABLET BY MOUTH AT NIGHT AS NEEDED(MAY TAKE A SECOND TABLET AS NEEDED)    baclofen (LIORESAL) 10 MG tablet TAKE 1 TABLET BY MOUTH AT NIGHT AS NEEDED(MAY TAKE A SECOND TABLET AS NEEDED)    benzonatate (TESSALON) 100 MG capsule Take 100 mg by mouth.    bisacodyl (DULCOLAX) 5 mg EC tablet Take 5 mg by mouth daily as needed for Constipation.    clopidogrel (PLAVIX) 75 mg tablet TAKE 1 TABLET(75 MG) BY MOUTH EVERY DAY    escitalopram oxalate (LEXAPRO) 20 MG tablet 20 mg. Pt taking 30 mg daily    gabapentin (NEURONTIN) 300 MG capsule Take 1 capsule (300 mg total) by mouth every evening. May gradually increase to twice a day or three times a day as needed for facial and arm pain    ondansetron (ZOFRAN-ODT) 4 MG TbDL Take 1 tablet (4 mg total) by mouth every 8 (eight) hours as needed.    promethazine (PHENERGAN) 25 MG tablet Take 1 tablet (25 mg total) by mouth 2 (two) times daily as needed for Nausea.    topiramate (TOPAMAX) 50 MG tablet Half tab PO qHS x 1 week, then half tab PO BID x 1 week, then half tab PO in AM and full pill in PM x 7 days,  then 1 tab BID    topiramate (TOPAMAX) 50 MG tablet Half tab PO qHS x 1 week, then half tab PO BID x 1 week, then half tab PO in AM and full pill in PM x 7 days, then 1 tab BID    isometheptene-apap-dichloralphenazone [id number] (MIDRIN) -325 mg per capsule Take 2 caps at onset of headache escalation then repeat 1 cap every hour to a max of 4 per day 2 days per week    memantine (NAMENDA TITRATION PACK) tablet pack Follow package directions.    memantine (NAMENDA) 5 MG Tab Take 1 tablet (5 mg total) by mouth 2 (two) times daily.     No current facility-administered medications for this visit.           Vitals:    11/29/17 0923   BP: (!) 101/54   Pulse: 66   Resp: 16     Body mass index is 27.79 kg/m².    Physical Exam:  Alert and fully oriented   Lungs CTA  Heart RRR  CN II-XII intact  Motor normal bulk and tone, symmetric strength  Coordination intact FTN  Sensory in tact to LT  Gait: normal, pace and base     Data review:    Lab Results   Component Value Date     09/20/2016    K 3.3 (L) 09/20/2016     (H) 09/20/2016    CO2 19 (L) 09/20/2016    BUN 12 09/20/2016    CREATININE 0.8 09/20/2016    GLU 95 09/20/2016    HGBA1C 5.0 09/20/2016    AST 17 09/20/2016    ALT 15 09/20/2016    ALBUMIN 3.6 09/20/2016    PROT 6.8 09/20/2016    BILITOT 0.4 09/20/2016    CHOL 126 09/20/2016    HDL 46 09/20/2016    LDLCALC 70.6 09/20/2016    TRIG 47 09/20/2016       Lab Results   Component Value Date    WBC 10.05 09/20/2016    HGB 12.0 09/20/2016    HCT 35.4 (L) 09/20/2016    MCV 87 09/20/2016     09/20/2016       Lab Results   Component Value Date    TSH 6.243 (H) 09/20/2016     Results for orders placed or performed during the hospital encounter of 09/19/16   MRI Brain W WO Contrast    Narrative    MRI OF THE BRAIN WITHOUT AND WITH IV CONTRAST:     Technique: Precontrast sagittal and axial T1, axial T2, and axial FLAIR and diffusion weighted images of the brain obtained. Then, 10 cc of gadavist was  injected intravenously and post gadolinium axial and coronal T1-weighted images obtained.  Noncontrast 3-D time of flight MRA head and postcontrast 3-D time-of-flight MRA head and neck were also performed.     Comparison: CTA head and neck 6/21/2016    Findings:    MRI Brain:     There are findings consistent with remote left MCA territory infarct with associated gliosis. There is serpiginous cortical-based T1 signal hyperintensity seen within the left frontal and parietal lobes consistent with cortical laminar necrosis. There is subtle asymmetric flair signal hyperintensity seen within the posterior limb of the left internal capsule and associated left cortical spinal tract likely reflecting Wallerian degeneration given the patient's ipsilateral left MCA territory infarct. There is no evidence of abnormal restricted diffusion on today's examination to suggest acute infarction. There is no evidence of acute intracranial hemorrhage.  The ventricles are normal in size without hydrocephalus.  No extra-axial collections are detected.    The sellar region is unremarkable. The craniocervical junction is within normal limits. The globes and orbits appear unremarkable. There is partial opacification of the right sphenoid sinus. After the administration of contrast, there are no abnormal areas of patholgic enhancement.  The major vascular flow voids are patent.    MRA head:    Anterior circulation:  The bilateral distal cervical, fernando, cavernous and supraclinoid segments of the ICA's are patent without evidence of significant focal stenosis or aneurysm. There are mildly diminished collateral vessels noted within the anterior left MCA distribution. The visualized bilateral anterior and middle cerebral arteries are otherwise patent without evidence for significant focal stenosis or aneurysm. There are small bilateral PCOMs.    Posterior circulation: The distal vertebral arteries, basilar artery and posterior cerebral  arteries are patent without evidence for significant focal stenosis or aneurysm.    MRA neck: The origins of the right brachycephalic,  left common carotid and left subclavian arteries from the arch are within normal limits. The origins of the vertebral arteries from the subclavian arteries are within normal limits. The vertebral arteries are unremarkable throughout their course without evidence for focal stenosis or occlusion.    Right carotid: The right common carotid artery, carotid bifurcation and extra-cranial portions of the internal carotid artery are patent without evidence for focal stenosis or occlusion.    Left carotid: The left common carotid artery, carotid bifurcation and extra-cranial portions of the internal carotid artery are patent without evidence for focal stenosis or occlusion.    Impression         1. Remote left MCA territory infarct with associated gliosis and cortical laminar necrosis. There is subtle asymmetric flair hyperintensity present within the posterior limb of the left internal capsule and corticospinal tract likely reflecting Wallerian degeneration given the patient's ipsilateral left MCA territory infarct. No evidence of abnormal restricted diffusion to suggest acute infarct on today's examination.    2. Mildly diminished collateral vessels within the anterior left MCA distribution. Otherwise, unremarkable MRA of the head specifically without evidence for focal stenosis or intracranial aneurysm.    3. Unremarkable MRA of the neck without evidence for significant focal stenosis or occlusion.      ______________________________________     Electronically signed by: PANDA DALY  Date:     09/20/16  Time:    07:50    MRA Brain    Narrative    MRI OF THE BRAIN WITHOUT AND WITH IV CONTRAST:     Technique: Precontrast sagittal and axial T1, axial T2, and axial FLAIR and diffusion weighted images of the brain obtained. Then, 10 cc of gadavist was injected intravenously and post  gadolinium axial and coronal T1-weighted images obtained.  Noncontrast 3-D time of flight MRA head and postcontrast 3-D time-of-flight MRA head and neck were also performed.     Comparison: CTA head and neck 6/21/2016    Findings:    MRI Brain:     There are findings consistent with remote left MCA territory infarct with associated gliosis. There is serpiginous cortical-based T1 signal hyperintensity seen within the left frontal and parietal lobes consistent with cortical laminar necrosis. There is subtle asymmetric flair signal hyperintensity seen within the posterior limb of the left internal capsule and associated left cortical spinal tract likely reflecting Wallerian degeneration given the patient's ipsilateral left MCA territory infarct. There is no evidence of abnormal restricted diffusion on today's examination to suggest acute infarction. There is no evidence of acute intracranial hemorrhage.  The ventricles are normal in size without hydrocephalus.  No extra-axial collections are detected.    The sellar region is unremarkable. The craniocervical junction is within normal limits. The globes and orbits appear unremarkable. There is partial opacification of the right sphenoid sinus. After the administration of contrast, there are no abnormal areas of patholgic enhancement.  The major vascular flow voids are patent.    MRA head:    Anterior circulation:  The bilateral distal cervical, fernando, cavernous and supraclinoid segments of the ICA's are patent without evidence of significant focal stenosis or aneurysm. There are mildly diminished collateral vessels noted within the anterior left MCA distribution. The visualized bilateral anterior and middle cerebral arteries are otherwise patent without evidence for significant focal stenosis or aneurysm. There are small bilateral PCOMs.    Posterior circulation: The distal vertebral arteries, basilar artery and posterior cerebral arteries are patent without evidence  for significant focal stenosis or aneurysm.    MRA neck: The origins of the right brachycephalic,  left common carotid and left subclavian arteries from the arch are within normal limits. The origins of the vertebral arteries from the subclavian arteries are within normal limits. The vertebral arteries are unremarkable throughout their course without evidence for focal stenosis or occlusion.    Right carotid: The right common carotid artery, carotid bifurcation and extra-cranial portions of the internal carotid artery are patent without evidence for focal stenosis or occlusion.    Left carotid: The left common carotid artery, carotid bifurcation and extra-cranial portions of the internal carotid artery are patent without evidence for focal stenosis or occlusion.    Impression         1. Remote left MCA territory infarct with associated gliosis and cortical laminar necrosis. There is subtle asymmetric flair hyperintensity present within the posterior limb of the left internal capsule and corticospinal tract likely reflecting Wallerian degeneration given the patient's ipsilateral left MCA territory infarct. No evidence of abnormal restricted diffusion to suggest acute infarct on today's examination.    2. Mildly diminished collateral vessels within the anterior left MCA distribution. Otherwise, unremarkable MRA of the head specifically without evidence for focal stenosis or intracranial aneurysm.    3. Unremarkable MRA of the neck without evidence for significant focal stenosis or occlusion.      ______________________________________     Electronically signed by: PANDA DALY  Date:     09/20/16  Time:    07:50        A/P:     Chronic Migraine responding not responsive to Botox as expected. Discontinue treatment for now.  Start Namenda, and stay on it for at least 4 months.  Decrease Topamax to 50 mg QHS.   Trial of Midrin for acute attacks  Left ischemic MCA stroke with residual mild aphasia  RTC as  scheduled    Counseling:  More than 50% of the 25 minute encounter was spent face to face counseling the patient regarding current status and future plan of care as well as side of the medications. All questions were answered to patient's satisfaction      Suman Mcginnis M.D  Medical Director, Headache and Facial Pain  [facility]

## 2017-11-30 DIAGNOSIS — G43.711 INTRACTABLE CHRONIC MIGRAINE WITHOUT AURA AND WITH STATUS MIGRAINOSUS: Primary | ICD-10-CM

## 2017-12-06 ENCOUNTER — PATIENT MESSAGE (OUTPATIENT)
Dept: NEUROLOGY | Facility: CLINIC | Age: 32
End: 2017-12-06

## 2017-12-18 ENCOUNTER — PATIENT MESSAGE (OUTPATIENT)
Dept: NEUROLOGY | Facility: CLINIC | Age: 32
End: 2017-12-18

## 2018-01-10 ENCOUNTER — OFFICE VISIT (OUTPATIENT)
Dept: NEUROLOGY | Facility: CLINIC | Age: 33
End: 2018-01-10
Payer: COMMERCIAL

## 2018-01-10 VITALS
BODY MASS INDEX: 28.2 KG/M2 | HEIGHT: 63 IN | SYSTOLIC BLOOD PRESSURE: 112 MMHG | WEIGHT: 159.13 LBS | DIASTOLIC BLOOD PRESSURE: 68 MMHG | HEART RATE: 68 BPM | RESPIRATION RATE: 18 BRPM

## 2018-01-10 DIAGNOSIS — G43.719 INTRACTABLE CHRONIC MIGRAINE WITHOUT AURA AND WITHOUT STATUS MIGRAINOSUS: ICD-10-CM

## 2018-01-10 DIAGNOSIS — R20.2 PARESTHESIAS IN RIGHT HAND: ICD-10-CM

## 2018-01-10 DIAGNOSIS — I69.320 APHASIA AS LATE EFFECT OF CEREBROVASCULAR ACCIDENT (CVA): ICD-10-CM

## 2018-01-10 DIAGNOSIS — Z86.73 HX OF ISCHEMIC LEFT MCA STROKE: Primary | ICD-10-CM

## 2018-01-10 PROCEDURE — 99213 OFFICE O/P EST LOW 20 MIN: CPT | Mod: S$PBB,,, | Performed by: PSYCHIATRY & NEUROLOGY

## 2018-01-10 PROCEDURE — 99999 PR PBB SHADOW E&M-EST. PATIENT-LVL III: CPT | Mod: PBBFAC,,, | Performed by: PSYCHIATRY & NEUROLOGY

## 2018-01-10 NOTE — PROGRESS NOTES
Subjective:         Patient ID: Amy Orta is a 32 y.o.  female who presents for follow up of L MCA stroke, aphasia and migraine    Initial / Last History of Present Illness:    Since our last visit, she decided to let her prozac rx run out.  Has been dealing with stressors, needed to get back on the Prozac.       Had seen an NP with psychiatry, does not wish to go back. Feels like her speech has plateaued.  Started going back to events with kids (baseball) or gym, seeing more people out socially.  With loud or busy activities will precipitate headche.  Talked about strategies to gradually immerse back into daily living, family, socializing, etc.      10/29/2017:     Has had headache on a daily basis since her last botox series. Has not seemed to help.  Trying to exercise more often but headaches may be limiting her activity.       Speech is stable.      Pain in the right arm remains stable, chronic, 3/10 overall.  On occasion, with activity may be more intense. Usually however she is not thinking about it. Just started Topamax 2 weeks ago; perhaps has noticed some mild language issues with this. Taking the  mg BID seems to have helped a little.       Has been using excedrin migraine two every 6 hours; in an average day may take 4 or 6 pills.  Has been taking this often for the past 3 weeks. Prior to that had been using more of the percocet.      She completed the medrol dosepak after rx 10/4, did not take pain meds but was still drinking caffeine.      Interval history since last visit:    Since our last visit, she saw Dr. Mcginnis for follow-up of her headaches.  Had opted not to proceed with Botox based on lack of efficacy of the last treatment.  Was still having headaches despite treatment for medication overuse headache.    Dr. Mcginnis started her on Namenda with the plan to stay on it for at least 4 months, reduced her Topamax to 50 mg at bedtime and give her a trial of Midrin for acute attacks.  Has been trying to get Cefaly device.     She got the Cefaly device and feels it is helping.  Midrin PRN, is helping.  CONLEY are less intense then they were, on average 1-3 days a week.      Language is improved after reduction in the TPM. No change in her strength, no new stroke sx.       Review of patient's allergies indicates:  No Known Allergies  Current Outpatient Prescriptions   Medication Sig Dispense Refill    baclofen (LIORESAL) 10 MG tablet TAKE 1 TABLET BY MOUTH AT NIGHT AS NEEDED(MAY TAKE A SECOND TABLET AS NEEDED) 60 tablet 0    baclofen (LIORESAL) 10 MG tablet TAKE 1 TABLET BY MOUTH AT NIGHT AS NEEDED(MAY TAKE A SECOND TABLET AS NEEDED)      benzonatate (TESSALON) 100 MG capsule Take 100 mg by mouth.      bisacodyl (DULCOLAX) 5 mg EC tablet Take 5 mg by mouth daily as needed for Constipation.      clopidogrel (PLAVIX) 75 mg tablet TAKE 1 TABLET(75 MG) BY MOUTH EVERY DAY 90 tablet 3    escitalopram oxalate (LEXAPRO) 20 MG tablet 20 mg. Pt taking 30 mg daily  1    isometheptene-apap-dichloralphenazone [id number] (MIDRIN) -325 mg per capsule Take 2 caps at onset of headache escalation then repeat 1 cap every hour to a max of 4 per day 2 days per week 20 capsule 3    memantine (NAMENDA TITRATION PACK) tablet pack Follow package directions. 42 tablet 0    memantine (NAMENDA) 5 MG Tab Take 1 tablet (5 mg total) by mouth 2 (two) times daily. 60 tablet 11    ondansetron (ZOFRAN-ODT) 4 MG TbDL Take 1 tablet (4 mg total) by mouth every 8 (eight) hours as needed. 10 tablet 3    promethazine (PHENERGAN) 25 MG tablet Take 1 tablet (25 mg total) by mouth 2 (two) times daily as needed for Nausea. 12 tablet 3    topiramate (TOPAMAX) 50 MG tablet Half tab PO qHS x 1 week, then half tab PO BID x 1 week, then half tab PO in AM and full pill in PM x 7 days, then 1 tab BID 60 tablet 11    topiramate (TOPAMAX) 50 MG tablet Half tab PO qHS x 1 week, then half tab PO BID x 1 week, then half tab PO in AM and  full pill in PM x 7 days, then 1 tab BID      gabapentin (NEURONTIN) 300 MG capsule TAKE 1 CAPSULE BY MOUTH EVERY EVENING MAY GRADUALLY INCREASE TO TWICE DAILY OR THREE TIMES DAILY AS NEEDED FOR FACIAL AND ARM PAIN 90 capsule 3     No current facility-administered medications for this visit.          Review of Systems  Review of Systems    Objective:        Vitals:    01/10/18 1031   BP: 112/68   Pulse: 68   Resp: 18     Body mass index is 28.18 kg/m².  Neurologic Exam     Mental Status   Oriented to person, place, and time.   Attention: normal. Concentration: normal.   Level of consciousness: alert  Knowledge: good.   Unable to name object. Normal comprehension.   Aphasia seems improved, choppy but only mild word finding difficulty     Motor Exam   Right arm pronator drift: absent  Left arm pronator drift: absentSlower fine movement of the right hand, unable to snap     Sensory Exam   Painful paresthesias R face and arm       Physical Exam   Constitutional: She is oriented to person, place, and time.   Neurological: She is oriented to person, place, and time.         Data Review:    Assessment:        1. Hx of ischemic left MCA stroke - s/p tPA 5/19/2016    2. Aphasia as late effect of cerebrovascular accident (CVA)    3. Paresthesias in right hand    4. Intractable chronic migraine without aura and without status migrainosus           Plan:         Problem List Items Addressed This Visit        Neuro    Migraine    Hx of ischemic left MCA stroke - Primary    Aphasia as late effect of cerebrovascular accident (CVA)       Other    Paresthesias in right hand      Start titration on Namenda - cognitive as well as potential pain benefits    Return in about 6 months (around 7/10/2018).       Thank you very much for the opportunity to assist in this patient's care.  If you have any questions or concerns, please do not hesitate to contact me at any time.     Sincerely,  Cedrick Diaz, DO

## 2018-01-10 NOTE — PATIENT INSTRUCTIONS
I'm glad the headaches are better - now that a little time has passed I think it would be a good idea to try the Namenda.

## 2018-01-12 RX ORDER — GABAPENTIN 300 MG/1
CAPSULE ORAL
Qty: 90 CAPSULE | Refills: 3 | Status: SHIPPED | OUTPATIENT
Start: 2018-01-12 | End: 2018-09-04 | Stop reason: SDUPTHER

## 2018-02-27 ENCOUNTER — PATIENT MESSAGE (OUTPATIENT)
Dept: NEUROLOGY | Facility: CLINIC | Age: 33
End: 2018-02-27

## 2018-02-27 DIAGNOSIS — G43.711 CHRONIC MIGRAINE WITHOUT AURA, WITH INTRACTABLE MIGRAINE, SO STATED, WITH STATUS MIGRAINOSUS: Primary | ICD-10-CM

## 2018-02-27 NOTE — TELEPHONE ENCOUNTER
Called and spoke with the patient's , Krunal. He stated his wife currently does not have any insurance and they would like to reschedule patient's appointment. Offered number for financial assistance. Krunal denied. Patient states she is doing okay and feels as if she does not need to be seen right now.  Appointment rescheduled to 05/30 at 1:40 pm. Krunal verbalized understanding.

## 2018-02-28 ENCOUNTER — PATIENT MESSAGE (OUTPATIENT)
Dept: NEUROLOGY | Facility: CLINIC | Age: 33
End: 2018-02-28

## 2018-02-28 DIAGNOSIS — G43.711 CHRONIC MIGRAINE WITHOUT AURA, WITH INTRACTABLE MIGRAINE, SO STATED, WITH STATUS MIGRAINOSUS: ICD-10-CM

## 2018-02-28 RX ORDER — BUTALBITAL, ACETAMINOPHEN AND CAFFEINE 50; 325; 40 MG/1; MG/1; MG/1
1 TABLET ORAL EVERY 4 HOURS PRN
Qty: 10 TABLET | Refills: 2 | Status: SHIPPED | OUTPATIENT
Start: 2018-02-28 | End: 2018-02-28 | Stop reason: SDUPTHER

## 2018-02-28 RX ORDER — BUTALBITAL, ACETAMINOPHEN AND CAFFEINE 50; 325; 40 MG/1; MG/1; MG/1
1 TABLET ORAL EVERY 4 HOURS PRN
Qty: 10 TABLET | Refills: 2 | Status: SHIPPED | OUTPATIENT
Start: 2018-02-28 | End: 2018-03-01 | Stop reason: SDUPTHER

## 2018-03-01 ENCOUNTER — PATIENT MESSAGE (OUTPATIENT)
Dept: NEUROLOGY | Facility: CLINIC | Age: 33
End: 2018-03-01

## 2018-03-01 ENCOUNTER — TELEPHONE (OUTPATIENT)
Dept: NEUROLOGY | Facility: CLINIC | Age: 33
End: 2018-03-01

## 2018-03-01 DIAGNOSIS — G43.711 CHRONIC MIGRAINE WITHOUT AURA, WITH INTRACTABLE MIGRAINE, SO STATED, WITH STATUS MIGRAINOSUS: ICD-10-CM

## 2018-03-01 RX ORDER — BUTALBITAL, ACETAMINOPHEN AND CAFFEINE 50; 325; 40 MG/1; MG/1; MG/1
1 TABLET ORAL EVERY 4 HOURS PRN
Qty: 10 TABLET | Refills: 2 | OUTPATIENT
Start: 2018-03-01 | End: 2018-03-31

## 2018-03-02 ENCOUNTER — PATIENT MESSAGE (OUTPATIENT)
Dept: NEUROLOGY | Facility: CLINIC | Age: 33
End: 2018-03-02

## 2018-03-05 ENCOUNTER — PATIENT MESSAGE (OUTPATIENT)
Dept: NEUROLOGY | Facility: CLINIC | Age: 33
End: 2018-03-05

## 2018-03-05 DIAGNOSIS — G43.711 CHRONIC MIGRAINE WITHOUT AURA, WITH INTRACTABLE MIGRAINE, SO STATED, WITH STATUS MIGRAINOSUS: ICD-10-CM

## 2018-03-06 ENCOUNTER — TELEPHONE (OUTPATIENT)
Dept: NEUROLOGY | Facility: CLINIC | Age: 33
End: 2018-03-06

## 2018-03-06 RX ORDER — BUTALBITAL, ACETAMINOPHEN AND CAFFEINE 50; 325; 40 MG/1; MG/1; MG/1
1 TABLET ORAL EVERY 6 HOURS PRN
Qty: 14 TABLET | Refills: 2 | Status: SHIPPED | OUTPATIENT
Start: 2018-03-06 | End: 2018-04-05

## 2018-03-06 RX ORDER — BUTALBITAL, ACETAMINOPHEN AND CAFFEINE 50; 325; 40 MG/1; MG/1; MG/1
1 TABLET ORAL EVERY 4 HOURS PRN
Qty: 10 TABLET | Refills: 2 | OUTPATIENT
Start: 2018-03-06 | End: 2018-04-05

## 2018-03-06 NOTE — TELEPHONE ENCOUNTER
----- Message from Edna Pan sent at 3/6/2018  9:42 AM CST -----  Contact: Krunal Orta - spouse  Patients spouse states pharmacy has not received prescription for butalbital-acetaminophen-caffeine -40 mg (FIORICET, ESGIC) -40 mg per tablet, his is requesting it re sent to       [id number] - [place], [place] - [address] AT [facility] [address]  [address]  [facility] [address]  Phone: [phone] Fax: [phone]    Contact Krunal at [phone] with any questions.     Thank you

## 2018-04-11 ENCOUNTER — PATIENT MESSAGE (OUTPATIENT)
Dept: NEUROLOGY | Facility: CLINIC | Age: 33
End: 2018-04-11

## 2018-04-12 ENCOUNTER — PATIENT MESSAGE (OUTPATIENT)
Dept: NEUROLOGY | Facility: CLINIC | Age: 33
End: 2018-04-12

## 2018-04-12 RX ORDER — BACLOFEN 10 MG/1
10 TABLET ORAL NIGHTLY
Qty: 30 TABLET | Refills: 3 | Status: SHIPPED | OUTPATIENT
Start: 2018-04-12 | End: 2021-03-02

## 2018-04-12 NOTE — TELEPHONE ENCOUNTER
Called patient to explain that we have no open appointments available before her scheduled appointment on 6/28/18. I let the patient know that I will put her on the wait list and contact her if something comes open. Patient verbalized understanding.

## 2018-04-13 ENCOUNTER — TELEPHONE (OUTPATIENT)
Dept: NEUROLOGY | Facility: CLINIC | Age: 33
End: 2018-04-13

## 2018-04-13 NOTE — TELEPHONE ENCOUNTER
----- Message from Ana Pacheco sent at 4/13/2018  1:35 PM CDT -----  Contact: [facility] eye office- [place] [phone]  The office called asking when did the doctor prescribed rx Topamax for the patient.  Thanks!

## 2018-05-11 ENCOUNTER — TELEPHONE (OUTPATIENT)
Dept: NEUROLOGY | Facility: CLINIC | Age: 33
End: 2018-05-11

## 2018-05-11 NOTE — TELEPHONE ENCOUNTER
Spoke with patient to let her know that appointment on 6/28 was cancelled. Was trying to reschedule new appointment and was disconnected. Will try to contact patient to make new appointment.

## 2018-09-04 RX ORDER — GABAPENTIN 300 MG/1
CAPSULE ORAL
Qty: 90 CAPSULE | Refills: 2 | Status: SHIPPED | OUTPATIENT
Start: 2018-09-04 | End: 2021-09-10 | Stop reason: SDUPTHER

## 2019-03-26 ENCOUNTER — HOSPITAL ENCOUNTER (EMERGENCY)
Facility: HOSPITAL | Age: 34
Discharge: HOME | End: 2019-03-26
Attending: EMERGENCY MEDICINE
Payer: MEDICARE

## 2019-03-26 VITALS
SYSTOLIC BLOOD PRESSURE: 112 MMHG | DIASTOLIC BLOOD PRESSURE: 63 MMHG | HEIGHT: 63 IN | HEART RATE: 66 BPM | WEIGHT: 160 LBS | BODY MASS INDEX: 28.35 KG/M2 | OXYGEN SATURATION: 100 % | TEMPERATURE: 98 F | RESPIRATION RATE: 16 BRPM

## 2019-03-26 DIAGNOSIS — G43.809 OTHER MIGRAINE WITHOUT STATUS MIGRAINOSUS, NOT INTRACTABLE: Primary | ICD-10-CM

## 2019-03-26 DIAGNOSIS — R51.9 HEADACHE: ICD-10-CM

## 2019-03-26 PROBLEM — R55 SYNCOPE AND COLLAPSE: Status: ACTIVE | Noted: 2019-03-26

## 2019-03-26 LAB
ALBUMIN SERPL BCP-MCNC: 3.9 G/DL (ref 3.5–5.2)
ALP SERPL-CCNC: 106 U/L (ref 55–135)
ALT SERPL W/O P-5'-P-CCNC: 53 U/L (ref 10–44)
ANION GAP SERPL CALC-SCNC: 6 MMOL/L (ref 8–16)
AST SERPL-CCNC: 35 U/L (ref 10–40)
B-HCG UR QL: NEGATIVE
BASOPHILS # BLD AUTO: 0 K/UL (ref 0–0.2)
BASOPHILS NFR BLD: 0.3 % (ref 0–1.9)
BILIRUB SERPL-MCNC: 0.2 MG/DL (ref 0.1–1)
BUN SERPL-MCNC: 10 MG/DL (ref 6–20)
CALCIUM SERPL-MCNC: 9.6 MG/DL (ref 8.7–10.5)
CHLORIDE SERPL-SCNC: 106 MMOL/L (ref 95–110)
CO2 SERPL-SCNC: 29 MMOL/L (ref 23–29)
CREAT SERPL-MCNC: 0.8 MG/DL (ref 0.5–1.4)
CTP QC/QA: YES
DIFFERENTIAL METHOD: ABNORMAL
EOSINOPHIL # BLD AUTO: 0.1 K/UL (ref 0–0.5)
EOSINOPHIL NFR BLD: 1.6 % (ref 0–8)
ERYTHROCYTE [DISTWIDTH] IN BLOOD BY AUTOMATED COUNT: 13.5 % (ref 11.5–14.5)
EST. GFR  (AFRICAN AMERICAN): >60 ML/MIN/1.73 M^2
EST. GFR  (NON AFRICAN AMERICAN): >60 ML/MIN/1.73 M^2
GLUCOSE SERPL-MCNC: 87 MG/DL (ref 70–110)
HCT VFR BLD AUTO: 36.6 % (ref 37–48.5)
HGB BLD-MCNC: 12 G/DL (ref 12–16)
LYMPHOCYTES # BLD AUTO: 2 K/UL (ref 1–4.8)
LYMPHOCYTES NFR BLD: 23.9 % (ref 18–48)
MCH RBC QN AUTO: 29.8 PG (ref 27–31)
MCHC RBC AUTO-ENTMCNC: 32.9 G/DL (ref 32–36)
MCV RBC AUTO: 91 FL (ref 82–98)
MONOCYTES # BLD AUTO: 0.4 K/UL (ref 0.3–1)
MONOCYTES NFR BLD: 5.5 % (ref 4–15)
NEUTROPHILS # BLD AUTO: 5.6 K/UL (ref 1.8–7.7)
NEUTROPHILS NFR BLD: 68.7 % (ref 38–73)
PLATELET # BLD AUTO: 416 K/UL (ref 150–350)
PMV BLD AUTO: 7.6 FL (ref 9.2–12.9)
POTASSIUM SERPL-SCNC: 4 MMOL/L (ref 3.5–5.1)
PROT SERPL-MCNC: 7.1 G/DL (ref 6–8.4)
RBC # BLD AUTO: 4.04 M/UL (ref 4–5.4)
SODIUM SERPL-SCNC: 141 MMOL/L (ref 136–145)
WBC # BLD AUTO: 8.2 K/UL (ref 3.9–12.7)

## 2019-03-26 PROCEDURE — 95816 PR EEG,W/AWAKE & DROWSY RECORD: ICD-10-PCS | Mod: 26,,, | Performed by: PSYCHIATRY & NEUROLOGY

## 2019-03-26 PROCEDURE — 81025 URINE PREGNANCY TEST: CPT | Performed by: EMERGENCY MEDICINE

## 2019-03-26 PROCEDURE — 63600175 PHARM REV CODE 636 W HCPCS: Performed by: EMERGENCY MEDICINE

## 2019-03-26 PROCEDURE — 96374 THER/PROPH/DIAG INJ IV PUSH: CPT

## 2019-03-26 PROCEDURE — 25000003 PHARM REV CODE 250: Performed by: EMERGENCY MEDICINE

## 2019-03-26 PROCEDURE — 99285 EMERGENCY DEPT VISIT HI MDM: CPT | Mod: 25

## 2019-03-26 PROCEDURE — 95816 EEG AWAKE AND DROWSY: CPT

## 2019-03-26 PROCEDURE — 36415 COLL VENOUS BLD VENIPUNCTURE: CPT

## 2019-03-26 PROCEDURE — 85025 COMPLETE CBC W/AUTO DIFF WBC: CPT

## 2019-03-26 PROCEDURE — 96361 HYDRATE IV INFUSION ADD-ON: CPT

## 2019-03-26 PROCEDURE — 95816 EEG AWAKE AND DROWSY: CPT | Mod: 26,,, | Performed by: PSYCHIATRY & NEUROLOGY

## 2019-03-26 PROCEDURE — 96375 TX/PRO/DX INJ NEW DRUG ADDON: CPT

## 2019-03-26 PROCEDURE — 80053 COMPREHEN METABOLIC PANEL: CPT

## 2019-03-26 RX ORDER — SODIUM CHLORIDE 9 MG/ML
1000 INJECTION, SOLUTION INTRAVENOUS
Status: COMPLETED | OUTPATIENT
Start: 2019-03-26 | End: 2019-03-26

## 2019-03-26 RX ORDER — DEXAMETHASONE SODIUM PHOSPHATE 4 MG/ML
8 INJECTION, SOLUTION INTRA-ARTICULAR; INTRALESIONAL; INTRAMUSCULAR; INTRAVENOUS; SOFT TISSUE
Status: COMPLETED | OUTPATIENT
Start: 2019-03-26 | End: 2019-03-26

## 2019-03-26 RX ORDER — BUTORPHANOL TARTRATE 1 MG/ML
1 INJECTION INTRAMUSCULAR; INTRAVENOUS
Status: COMPLETED | OUTPATIENT
Start: 2019-03-26 | End: 2019-03-26

## 2019-03-26 RX ORDER — KETOROLAC TROMETHAMINE 30 MG/ML
30 INJECTION, SOLUTION INTRAMUSCULAR; INTRAVENOUS
Status: COMPLETED | OUTPATIENT
Start: 2019-03-26 | End: 2019-03-26

## 2019-03-26 RX ORDER — METOCLOPRAMIDE HYDROCHLORIDE 5 MG/ML
10 INJECTION INTRAMUSCULAR; INTRAVENOUS
Status: COMPLETED | OUTPATIENT
Start: 2019-03-26 | End: 2019-03-26

## 2019-03-26 RX ADMIN — BUTORPHANOL TARTRATE 1 MG: 1 INJECTION, SOLUTION INTRAMUSCULAR; INTRAVENOUS at 12:03

## 2019-03-26 RX ADMIN — METOCLOPRAMIDE 10 MG: 5 INJECTION, SOLUTION INTRAMUSCULAR; INTRAVENOUS at 11:03

## 2019-03-26 RX ADMIN — SODIUM CHLORIDE 1000 ML: 0.9 INJECTION, SOLUTION INTRAVENOUS at 11:03

## 2019-03-26 RX ADMIN — KETOROLAC TROMETHAMINE 30 MG: 30 INJECTION, SOLUTION INTRAMUSCULAR at 11:03

## 2019-03-26 RX ADMIN — DEXAMETHASONE SODIUM PHOSPHATE 8 MG: 4 INJECTION, SOLUTION INTRAMUSCULAR; INTRAVENOUS at 11:03

## 2019-03-26 NOTE — ED PROVIDER NOTES
"Encounter Date: 3/26/2019    SCRIBE #1 NOTE: I, Marlene Garcia, am scribing for, and in the presence of, Prince Ruelas MD.       History     Chief Complaint   Patient presents with    Migraine     C/o migraine x 1 week. Unrelieved by Fioricet. +nausea/vomiting       Time seen by provider: 10:40 AM on 2019    Amy Orta is a 33 y.o. female who presents to the ED with an onset of a migraine with associated light sensitivity, nausea, and vomiting. She reports having a constant migraine for the past week but worsened 4 days ago. Her HA is located "all over." The patient has endorsed a decrease PO intake as she is unable to hold anything down without vomiting. She states she has passed out twice that she is able to recall. When she passed out the first time 3 days ago, the patient was shaking when she came to. She has taken Fioricet with no relief as well as Benadryl to help with sleep. The patient began to endorse blurry vision in her right eye yesterday. She is unsure if she has endorsed a fever. Currently, the patient endorses nausea. She has right-sided facial numbness and speech problems from her prior stroke but states both are unchanged from baseline. The patient was referred to the ER by Neurologist Dr. Bijan Kitchen for further evaluation. She denies loss of bowel or urine control, weakness/numbness in arms or legs, or any other symptoms at this time. The patient has a PMHx of migraine HA's and prior stroke (2016). She has a SHx including a cholecystectomy,  section, and a D&C. No known drug allergies noted.    The history is provided by the patient.     Review of patient's allergies indicates:  No Known Allergies  Past Medical History:   Diagnosis Date    Encounter for blood transfusion     Migraines     Stroke 2016    received IV tPA.  Stroke following      Past Surgical History:   Procedure Laterality Date     SECTION      CHOLECYSTECTOMY N/A     DILATION AND " CURETTAGE OF UTERUS       Family History   Problem Relation Age of Onset    Osteoporosis Mother     Hepatitis Father         Hep C    Migraines Father     Stroke Maternal Grandfather     COPD Paternal Grandmother     Alzheimer's disease Paternal Grandfather     Clotting disorder Neg Hx      Social History     Tobacco Use    Smoking status: Never Smoker    Smokeless tobacco: Never Used   Substance Use Topics    Alcohol use: No    Drug use: No     Review of Systems   Constitutional: Positive for appetite change (secondary to nausea & vomiting). Negative for activity change, chills and fatigue.   Eyes: Positive for photophobia and visual disturbance (blurry vision, right eye).   Respiratory: Negative for apnea and shortness of breath.    Cardiovascular: Negative for chest pain and palpitations.   Gastrointestinal: Positive for nausea and vomiting. Negative for abdominal distention and abdominal pain.        Negative for bowel incontinence.    Genitourinary: Negative for difficulty urinating.        Negative for urinary incontinence.    Musculoskeletal: Negative for neck pain.   Skin: Negative for pallor and rash.   Neurological: Positive for tremors, syncope, speech difficulty (chronic, unchanged), numbness (chronic right-sided facial, unchanged) and headaches. Negative for weakness.   Hematological: Does not bruise/bleed easily.   Psychiatric/Behavioral: Negative for agitation.     Physical Exam     Initial Vitals   BP Pulse Resp Temp SpO2   03/26/19 1032 03/26/19 1031 03/26/19 1031 03/26/19 1031 03/26/19 1031   119/74 79 16 97.6 °F (36.4 °C) 99 %      MAP       --                Physical Exam    Nursing note and vitals reviewed.  Constitutional: She appears well-developed and well-nourished.   HENT:   Head: Normocephalic and atraumatic.   Eyes: Conjunctivae are normal. Pupils are equal, round, and reactive to light.   Wearing sunglasses. EOMI.    Neck: Normal range of motion. Neck supple.   Cardiovascular:  Normal rate, regular rhythm and normal heart sounds. Exam reveals no gallop and no friction rub.    No murmur heard.  Pulmonary/Chest: Effort normal and breath sounds normal. No respiratory distress. She has no wheezes. She has no rhonchi. She has no rales.   Abdominal: Soft. She exhibits no distension. There is no tenderness.   Musculoskeletal: Normal range of motion.   Neurological: She is alert and oriented to person, place, and time. She has normal strength. A sensory deficit is present.   Normal strength in BUE's and BLE's. Chronic decreased light touch sensation to the entire right side of face.    Skin: Skin is warm and dry. No erythema.   Psychiatric: She has a normal mood and affect.       ED Course   Critical Care  Date/Time: 3/26/2019 5:39 PM  Performed by: Prince Ruelas III, MD  Authorized by: Prince Ruelas III, MD   Direct patient critical care time: 60 minutes  Documentation critical care time: 10 minutes  Consulting other physicians critical care time: 5 minutes  Total critical care time (exclusive of procedural time) : 75 minutes  Critical care was necessary to treat or prevent imminent or life-threatening deterioration of the following conditions: CNS failure or compromise.  Critical care was time spent personally by me on the following activities: review of old charts, pulse oximetry, ordering and review of laboratory studies, obtaining history from patient or surrogate, evaluation of patient's response to treatment, discussions with consultants, examination of patient, ordering and performing treatments and interventions, ordering and review of radiographic studies, re-evaluation of patient's condition and development of treatment plan with patient or surrogate.  Subsequent provider of critical care: I assumed direction of critical care for this patient from another provider of my specialty.        Labs Reviewed   CBC W/ AUTO DIFFERENTIAL - Abnormal; Notable for the following components:        Result Value    Hematocrit 36.6 (*)     Platelets 416 (*)     MPV 7.6 (*)     All other components within normal limits   COMPREHENSIVE METABOLIC PANEL - Abnormal; Notable for the following components:    ALT 53 (*)     Anion Gap 6 (*)     All other components within normal limits   POCT URINE PREGNANCY        Imaging Results          MRI Brain Without Contrast (Final result)  Result time 03/26/19 12:48:39    Final result by Thomas Majano MD (03/26/19 12:48:39)                 Impression:      1. There is no acute abnormality.  There is gliosis and encephalomalacia secondary to a remote left middle cerebral artery vascular territory infarction with ex vacuo dilatation of the left lateral ventricle.  There is no intracranial hemorrhage or mass.  There is no acute infarction.  2. Left mastoid effusion.      Electronically signed by: Thomas Majano MD  Date:    03/26/2019  Time:    12:48             Narrative:    EXAM:  MRI BRAIN WITHOUT CONTRAST    CLINICAL HISTORY:  Headache, acute, severe, thunderclap, worst HA of life; .    COMPARISON:  Head CT obtained at 10:54 today, brain MRI dated 09/20/2016    FINDINGS:  Intracranial contents:There is no acute intracranial abnormality or change in the appearance of the brain compared to the head CT.  Redemonstrated is sequelae from remote left middle cerebral artery vascular territory infarction with encephalomalacia and gliosis in the left frontal and parietal lobes extending into the posterior and lateral basal ganglia.  There is ex vacuo dilatation of the left lateral ventricle.  Redemonstrated is subtle abnormal FLAIR and T2 hyperintense signal along the cortical spinal tract extending to the left cerebral peduncle.  These latter changes represent wallerian degeneration related to the remote left middle cerebral artery infarction.  There is no hemorrhage.  There are no regions of restricted diffusion to suggest acute infarction.  There is no midline shift.   There is no other signal abnormality in the brain.  There is no abnormal extra-axial fluid collection.  The basilar cisterns are open.  Flow voids indicating patency are present in the major vessels at the base of the brain.  The sellar structures are normal.  The orbits are grossly normal.    Extracranial contents, calvarium, soft tissues:Baseline marrow signal intensity is normal.  The paranasal sinuses are clear.  As seen on comparison CT, there is partial opacification of the left mastoid air cells consistent with a mastoid effusion.                               MRV Brain Without Contrast (Final result)  Result time 03/26/19 13:01:32    Final result by Thomas Majano MD (03/26/19 13:01:32)                 Impression:      1. 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  Note: Results of brain MRI, brain MRA and brain MRV were discussed directly with Dr. Bijan Kitchen at 12:49 pm 3/26/2019.      Electronically signed by: Thomas Majano MD  Date:    03/26/2019  Time:    13:01             Narrative:    EXAMINATION:  MRV BRAIN WITHOUT CONTRAST    CLINICAL HISTORY:  headache;    TECHNIQUE:  2D time-of-flight coronal and 3D sagittal brain MRV was performed.  MPR/MIP 3D  reformatted images were created.    COMPARISON:  CTA head and neck dated 06/21/2016    FINDINGS:  There is diminished caliber of the left transverse sinus but there is flow related signal intensity within this dural sinus.  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  There is a similar appearance on comparison CTA head and neck.  Otherwise, there is normal flow related signal intensity within the superior sagittal sinus, straight sinus, right transverse and sigmoid sinus and the included internal jugular veins.  The internal cerebral veins are patent as well.                               MRA Brain without contrast (Final result)  Result  time 03/26/19 12:54:07    Final result by Thomas Majano MD (03/26/19 12:54:07)                 Impression:      1. There is no acute abnormality or change in the appearance of the intracranial arteries when compared to the prior brain MRA.  There is a remote left middle cerebral artery infarction with some attenuation of the distal left MCA branches.  There is no critical stenosis, occlusion, thrombosis, dissection, large aneurysm or other vascular malformation in the vessels which comprise the anterior and posterior circulation.      Electronically signed by: Thomas Majano MD  Date:    03/26/2019  Time:    12:54             Narrative:    EXAMINATION:  MRA BRAIN WITHOUT CONTRAST    CLINICAL HISTORY:  Headache, chronic, new features or neuro deficit;    TECHNIQUE:  3D Time-of-flight Images were performed over the brain.  MIP/MPR 3D  reformatted images were created.  Contrast was not administered    COMPARISON:  Brain MRA dated 09/20/2016    FINDINGS:  Anterior circulation: There is normal flow related signal intensity within the petrous and cavernous segments of the internal carotid arteries.  The supraclinoid internal carotid arteries are normal as is the carotid terminus bilaterally.  There is normal branching into the anterior and middle cerebral arteries.  These vessels are patent.  There is no critical stenosis, occlusion, thrombosis, dissection, aneurysm or other vascular malformation.  There is some attenuation of the distal left frontal middle cerebral artery branches.  This is in the distribution of a remote infarction.    Posterior circulation: The vertebral arteries are patent.  The basilar artery is normal in caliber and contour.  The basilar tip is normal.  There is normal branching into the superior cerebellar and posterior cerebral arteries.  There is no critical stenosis, occlusion, thrombosis, dissection, aneurysm.                               CT Head Without Contrast (Final result)   Result time 03/26/19 11:31:57    Final result by Elia Garcia MD (03/26/19 11:31:57)                 Impression:      No acute intracranial process.  Chronic left frontal infarct.      Electronically signed by: Elia Garcia MD  Date:    03/26/2019  Time:    11:31             Narrative:    EXAMINATION:  CT HEAD WITHOUT CONTRAST    CLINICAL HISTORY:  Headache, acute, norm neuro exam; Headache    TECHNIQUE:  Low dose axial images were obtained through the head.  Coronal and sagittal reformations were also performed. Contrast was not administered.    COMPARISON:  06/21/2016    FINDINGS:  A moderate region of left frontal encephalomalacia is present.  There is no intracranial hemorrhage.  No mass or midline shift.  The ventricles are normal in size.  The calvarium is intact.                                 Medical Decision Making:   History:   Old Medical Records: I decided to obtain old medical records.  Clinical Tests:   Lab Tests: Ordered and Reviewed  Radiological Study: Reviewed and Ordered  ED Management:  33-year-old female with a history of migraine headaches presents with a five-day history of a persisting global headache. She developed 2 episodes of syncope and what is described as a seizure.  I discussed the case with Dr. Fox who recommends a workup which includes an MRI of the brain which is normal.  The symptoms are relieved with ketorolac, steroids and state all.  The symptoms are most consistent with a complex migraine headache.       APC / Resident Notes:   I, Dr. Prince Ruelas III, personally performed the services described in this documentation. All medical record entries made by the scribe were at my direction and in my presence.  I have reviewed the chart and agree that the record reflects my personal performance and is accurate and complete       Scribe Attestation:   Scribe #1: I performed the above scribed service and the documentation accurately describes the services I  performed. I attest to the accuracy of the note.               Clinical Impression:       ICD-10-CM ICD-9-CM   1. Other migraine without status migrainosus, not intractable G43.809 346.80   2. Headache R51 784.0         Disposition:   Disposition: Discharged  Condition: Stable                        Prince Ruelas III, MD  03/26/19 9022

## 2019-03-26 NOTE — PROCEDURES
Routine EEG Report      Amy Orta  [id number]  1985    DATE OF SERVICE: 3/26/2019  REASON FOR CONSULT:  33-year-old woman with episodes of loss of consciousness.  Evaluate for evidence of epileptiform activity.    METHODOLOGY   Electroencephalographic (EEG) recording is with electrodes placed according to the International 10-20 placement system.  Thirty two (32) channels of digital signal (sampling rate of 512/sec) including T1 and T2 was simultaneously recorded from the scalp and may include  EKG, EMG, and/or eye monitors.  Recording band pass was 0.1 to 512 hz.  Digital video recording of the patient is simultaneously recorded with the EEG.  The patient is instructed report clinical symptoms which may occur during the recording session.  EEG and video recording is stored and archived in digital format. Activation procedures which include photic stimulation, hyperventilation and instructing patients to perform simple task are done in selected patients.    The EEG is displayed on a monitor screen and can be reviewed using different montages.  Computer assisted analysis is employed to detect spike and electrographic seizure activity.   The entire record is submitted for computer analysis.  The entire recording is visually reviewed and the times identified by computer analysis as being spikes or seizures are reviewed again.  Compresses spectral analysis (CSA) is also performed on the activity recorded from each individual channel.  This is displayed as a power display of frequencies from 0 to 30 Hz over time.   The CSA is reviewed looking for asymmetries in power between homologous areas of the scalp and then compared with the original EEG recording.     Seemage software is also utilized in the review of this study.  This software suite analyzes the EEG recording in multiple domains.  Coherence and rhythmicity is computed to identify EEG sections which may contain organized seizures.  Each channel  undergoes analysis to detect presence of spike and sharp waves which have special and morphological characteristic of epileptic activity.  The routine EEG recording is converted from spacial into frequency domain.  This is then displayed comparing homologous areas to identify areas of significant asymmetry.  Algorithm to identify non-cortically generated artifact is used to separate eye movement, EMG and other artifact from the EEG.      EEG FINDINGS  Background activity:   The background is predominantly 8-9 hz alpha activity with a well-formed 9 hz posterior dominant rhythm.    Sleep:  During drowsiness, the alpha rhythm attenuates and 5-7 hz slow waves appear diffusely followed by symmetric vertex waves with admixed beta frequencies.  Stage 2 sleep is not recorded.    Activation procedures:   Hyperventilation is not performed.  Photic stimulation is performed with no activation of the record.    Cardiac Monitor:   Heart rate is regular with rates ranging from 70-80 bpm.     IMPRESSION:   This is a normal awake and drowsy EEG.  There are no focal findings, no epileptiform discharges, and no electrographic seizures.  It is important to note that a normal EEG does not rule out seizures/epilepsy.  Clinical correlation is recommended.    Brittany Waldrop MD PhD  Neurology-Epilepsy  [facility].  Office extension: [phone]

## 2019-03-26 NOTE — ASSESSMENT & PLAN NOTE
New onset x2 on Saturday  Pt reports it lasted a few seconds that she is aware of - it was witnessed and shaking was reported   She denies biting her tongue or incontinence    ? Possible syncope from dehydration   EEG 30 minutes today  Pt instructed no driving for now

## 2019-03-26 NOTE — ED TRIAGE NOTES
Pt reports headache x 1 wk. Reports hx of migraines typically relieved with fiorcet. Denies any relief. Pt reports headache has been constant. Reports nausea and light sensitivity. Hx of stroke 3 yrs ago with R side facial numbness resulting.

## 2019-03-26 NOTE — ASSESSMENT & PLAN NOTE
Pt presenting with complicating migraine that has gone on for about a week  Pt states worst HA of her life    Receiving IV hydration, steroids, Reglan, and Toradol in ER.   States headache is beginning to be relieved with these therapies.     MRI brain non contrast  MRA brain non contrast  MRV brain non contrast

## 2019-03-26 NOTE — HPI
"Amy Orta is a 33 y.o. female with migraine HA's and prior stroke (2016) who presents to the ED with an onset of a migraine with associated light sensitivity, nausea, and vomiting. She reports having a constant migraine for the past week but worsened 4 days ago. Her HA is located "all over" her head. The patient has endorsed a decrease PO intake as she is unable to hold anything down without vomiting. She states she has passed out twice that she is able to recall. When she passed out the first time 3 days ago, the patient was shaking when she came to. She has taken Fioricet with no relief as well as Benadryl to help with sleep. The patient began to endorse blurry vision in her right eye on . She is unsure if she has endorsed a fever. Currently, the patient endorses nausea. She has right-sided facial numbness and speech problems from her prior stroke but states both are unchanged from baseline. The patient was referred to the ER by Neurologist Dr. Bijan Kitchen for further evaluation. She denies loss of bowel or urine control, weakness/numbness in arms or legs, or any other symptoms at this time. The patient has a SHx including a cholecstectomy,  section, and a D&C. No known drug allergies noted.      "

## 2019-03-26 NOTE — CONSULTS
"[facility]  Neurology  Consult Note    Patient Name: Amy Orta  MRN: [id number]  Admission Date: 3/26/2019  Hospital Length of Stay: 0 days  Code Status: Prior   Attending Provider: Prince Ruelas III, MD   Consulting Provider: GUILLAUME Mtz  Primary Care Physician: Primary Doctor No  Principal Problem:<principal problem not specified>    Consults   Subjective:     Chief Complaint:  headache     HPI:   Amy Orta is a 33 y.o. female with migraine HA's and prior stroke (2016) who presents to the ED with an onset of a migraine with associated light sensitivity, nausea, and vomiting. She reports having a constant migraine for the past week but worsened 4 days ago. Her HA is located "all over" her head. The patient has endorsed a decrease PO intake as she is unable to hold anything down without vomiting. She states she has passed out twice that she is able to recall. When she passed out the first time 3 days ago, the patient was shaking when she came to. She has taken Fioricet with no relief as well as Benadryl to help with sleep. The patient began to endorse blurry vision in her right eye on . She is unsure if she has endorsed a fever. Currently, the patient endorses nausea. She has right-sided facial numbness and speech problems from her prior stroke but states both are unchanged from baseline. The patient was referred to the ER by Neurologist Dr. Bijan Kitchen for further evaluation. She denies loss of bowel or urine control, weakness/numbness in arms or legs, or any other symptoms at this time. The patient has a SHx including a cholecstectomy,  section, and a D&C. No known drug allergies noted.         Past Medical History:   Diagnosis Date    Encounter for blood transfusion     Migraines     Stroke 2016    received IV tPA.  Stroke following        Past Surgical History:   Procedure Laterality Date     SECTION      CHOLECYSTECTOMY N/A " 2014    DILATION AND CURETTAGE OF UTERUS         Review of patient's allergies indicates:  No Known Allergies    Current Neurological Medications: reviewed     No current facility-administered medications on file prior to encounter.      Current Outpatient Medications on File Prior to Encounter   Medication Sig    baclofen (LIORESAL) 10 MG tablet TAKE 1 TABLET BY MOUTH AT NIGHT AS NEEDED(MAY TAKE A SECOND TABLET AS NEEDED)    baclofen (LIORESAL) 10 MG tablet Take 1 tablet (10 mg total) by mouth every evening.    bisacodyl (DULCOLAX) 5 mg EC tablet Take 5 mg by mouth daily as needed for Constipation.    clopidogrel (PLAVIX) 75 mg tablet TAKE 1 TABLET(75 MG) BY MOUTH EVERY DAY    escitalopram oxalate (LEXAPRO) 20 MG tablet 20 mg. Pt taking 30 mg daily    gabapentin (NEURONTIN) 300 MG capsule TAKE ONE CAPSULE BY MOUTH EVERY EVENING; MAY GRADUALLY INCREASE TO TWICE DAILY OR THREE TIMES DAILY AS NEEDED FOR FACIAL AND ARM PAIN    isometheptene-apap-dichloralphenazone [id number] (MIDRIN) -325 mg per capsule Take 2 caps at onset of headache escalation then repeat 1 cap every hour to a max of 4 per day 2 days per week    memantine (NAMENDA) 5 MG Tab Take 1 tablet (5 mg total) by mouth 2 (two) times daily.    ondansetron (ZOFRAN-ODT) 4 MG TbDL Take 1 tablet (4 mg total) by mouth every 8 (eight) hours as needed.    promethazine (PHENERGAN) 25 MG tablet Take 1 tablet (25 mg total) by mouth 2 (two) times daily as needed for Nausea.    topiramate (TOPAMAX) 50 MG tablet Half tab PO qHS x 1 week, then half tab PO BID x 1 week, then half tab PO in AM and full pill in PM x 7 days, then 1 tab BID    topiramate (TOPAMAX) 50 MG tablet Half tab PO qHS x 1 week, then half tab PO BID x 1 week, then half tab PO in AM and full pill in PM x 7 days, then 1 tab BID     Family History     Problem Relation (Age of Onset)    Alzheimer's disease Paternal Grandfather    COPD Paternal Grandmother    Hepatitis Father    Migraines  Father    Osteoporosis Mother    Stroke Maternal Grandfather        Tobacco Use    Smoking status: Never Smoker    Smokeless tobacco: Never Used   Substance and Sexual Activity    Alcohol use: No    Drug use: No    Sexual activity: Never     Review of Systems   Constitutional: Negative.    HENT: Negative.    Eyes: Positive for photophobia and visual disturbance.   Respiratory: Negative.    Cardiovascular: Negative.    Gastrointestinal: Positive for nausea and vomiting.   Endocrine: Negative.    Genitourinary: Negative.    Musculoskeletal: Negative.    Skin: Negative.    Allergic/Immunologic: Negative.    Neurological: Positive for syncope and headaches.   Hematological: Negative.    Psychiatric/Behavioral: Negative.      Objective:     Vital Signs (Most Recent):  Temp: 97.6 °F (36.4 °C) (03/26/19 1031)  Pulse: 79 (03/26/19 1031)  Resp: 16 (03/26/19 1031)  BP: 119/74 (03/26/19 1032)  SpO2: 99 % (03/26/19 1031) Vital Signs (24h Range):  Temp:  [97.6 °F (36.4 °C)] 97.6 °F (36.4 °C)  Pulse:  [79] 79  Resp:  [16] 16  SpO2:  [99 %] 99 %  BP: (119)/(74) 119/74     Weight: 72.6 kg (160 lb)  Body mass index is 28.34 kg/m².    Physical Exam   Constitutional: She is oriented to person, place, and time. She appears well-developed and well-nourished.   HENT:   Head: Normocephalic and atraumatic.   Eyes: Pupils are equal, round, and reactive to light. Conjunctivae, EOM and lids are normal.   Neck: Normal range of motion and full passive range of motion without pain. Neck supple.   Cardiovascular: Normal rate and regular rhythm.   Pulmonary/Chest: Effort normal and breath sounds normal.   Abdominal: Soft. Bowel sounds are normal.   Musculoskeletal: Normal range of motion.   Neurological: She is alert and oriented to person, place, and time. She has normal reflexes. She has a normal Finger-Nose-Finger Test and a normal Heel to Shin Test.   Reflex Scores:       Tricep reflexes are 2+ on the right side and 2+ on the left side.        Bicep reflexes are 2+ on the right side and 2+ on the left side.       Brachioradialis reflexes are 2+ on the right side and 2+ on the left side.       Patellar reflexes are 2+ on the right side and 2+ on the left side.       Achilles reflexes are 2+ on the right side and 2+ on the left side.  Skin: Skin is warm and dry.   Psychiatric: She has a normal mood and affect.       NEUROLOGICAL EXAMINATION:     MENTAL STATUS   Oriented to person, place, and time.   Level of consciousness: alert    CRANIAL NERVES     CN II   Right visual field deficit: right field cut noted   Left visual field deficit: none     CN III, IV, VI   Pupils are equal, round, and reactive to light.  Extraocular motions are normal.     CN V   Right facial sensation deficit: decreased facial sensation (chronic)    Left facial sensation deficit: none    CN VII   Facial expression full, symmetric.     CN VIII   CN VIII normal.     CN XI   CN XI normal.     MOTOR EXAM     Strength   Right neck flexion: 5/5  Left neck flexion: 5/5  Right neck extension: 5/5  Left neck extension: 5/5  Right deltoid: 5/5  Left deltoid: 5/5  Right biceps: 5/5  Left biceps: 5/5  Right triceps: 5/5  Left triceps: 5/5  Right wrist flexion: 5/5  Left wrist flexion: 5/5  Right wrist extension: 5/5  Left wrist extension: 5/5  Right interossei: 5/5  Left interossei: 5/5  Right abdominals: 5/5  Left abdominals: 5/5  Right iliopsoas: 5/5  Left iliopsoas: 5/5  Right quadriceps: 5/5  Left quadriceps: 5/5  Right hamstrin/5  Left hamstrin/5  Right glutei: 5/5  Left glutei: 5/5  Right anterior tibial: 5/5  Left anterior tibial: 5/5  Right posterior tibial: 5/5  Left posterior tibial: 5/5  Right peroneal: 5/5  Left peroneal: 5/5  Right gastroc: 5/5  Left gastroc: 5/5    REFLEXES     Reflexes   Right brachioradialis: 2+  Left brachioradialis: 2+  Right biceps: 2+  Left biceps: 2+  Right triceps: 2+  Left triceps: 2+  Right patellar: 2+  Left patellar: 2+  Right achilles:  2+  Left achilles: 2+  Right : 2+  Left : 2+    SENSORY EXAM   Light touch normal.     GAIT AND COORDINATION      Coordination   Finger to nose coordination: normal  Heel to shin coordination: normal    Tremor   Resting tremor: absent  Intention tremor: absent       Gait not tested       Significant Labs: All pertinent lab results from the past 24 hours have been reviewed.    Significant Imaging: I have reviewed and interpreted all pertinent imaging results/findings within the past 24 hours.    Assessment and Plan:     Syncope and collapse  New onset x2 on Saturday  Pt reports it lasted a few seconds that she is aware of - it was witnessed and shaking was reported   She denies biting her tongue or incontinence    ? Possible syncope from dehydration   EEG 30 minutes today  Pt instructed no driving for now    Migraine  Pt presenting with complicating migraine that has gone on for about a week  Pt states worst HA of her life    Receiving IV hydration, steroids, Reglan, and Toradol in ER.   States headache is beginning to be relieved with these therapies.     MRI brain non contrast  MRA brain non contrast  MRV brain non contrast     CVA (cerebral infarction)  Continue Plavix  Right eye field cut noted  -Pt instructed on no driving at this time    Stroke education was provided.  If patient has acute neurological changes including weakness, confusion, speech changes, facial droop, difficulty walking, and sensory changes immediately call 911.  Follow up Neurology in 2 weeks at [phone].  Medication side effects discussed with the patient and/or caregiver.          VTE Risk Mitigation (From admission, onward)    None          Thank you for your consult. I will follow-up with patient. Please contact us if you have any additional questions.    GUILLAUME Mtz  [facility] I, Dr. Ian Kitchen, have personally seen and examined the patient with my advanced provider and  agree with above. I personally did a focused exam, and reviewed all necessary clinical information. I discussed my management plan with my NP and agree with above.

## 2019-03-26 NOTE — SUBJECTIVE & OBJECTIVE
Past Medical History:   Diagnosis Date    Encounter for blood transfusion     Migraines     Stroke 2016    received IV tPA.  Stroke following        Past Surgical History:   Procedure Laterality Date     SECTION      CHOLECYSTECTOMY N/A     DILATION AND CURETTAGE OF UTERUS         Review of patient's allergies indicates:  No Known Allergies    Current Neurological Medications: reviewed     No current facility-administered medications on file prior to encounter.      Current Outpatient Medications on File Prior to Encounter   Medication Sig    baclofen (LIORESAL) 10 MG tablet TAKE 1 TABLET BY MOUTH AT NIGHT AS NEEDED(MAY TAKE A SECOND TABLET AS NEEDED)    baclofen (LIORESAL) 10 MG tablet Take 1 tablet (10 mg total) by mouth every evening.    bisacodyl (DULCOLAX) 5 mg EC tablet Take 5 mg by mouth daily as needed for Constipation.    clopidogrel (PLAVIX) 75 mg tablet TAKE 1 TABLET(75 MG) BY MOUTH EVERY DAY    escitalopram oxalate (LEXAPRO) 20 MG tablet 20 mg. Pt taking 30 mg daily    gabapentin (NEURONTIN) 300 MG capsule TAKE ONE CAPSULE BY MOUTH EVERY EVENING; MAY GRADUALLY INCREASE TO TWICE DAILY OR THREE TIMES DAILY AS NEEDED FOR FACIAL AND ARM PAIN    isometheptene-apap-dichloralphenazone [id number] (MIDRIN) -325 mg per capsule Take 2 caps at onset of headache escalation then repeat 1 cap every hour to a max of 4 per day 2 days per week    memantine (NAMENDA) 5 MG Tab Take 1 tablet (5 mg total) by mouth 2 (two) times daily.    ondansetron (ZOFRAN-ODT) 4 MG TbDL Take 1 tablet (4 mg total) by mouth every 8 (eight) hours as needed.    promethazine (PHENERGAN) 25 MG tablet Take 1 tablet (25 mg total) by mouth 2 (two) times daily as needed for Nausea.    topiramate (TOPAMAX) 50 MG tablet Half tab PO qHS x 1 week, then half tab PO BID x 1 week, then half tab PO in AM and full pill in PM x 7 days, then 1 tab BID    topiramate (TOPAMAX) 50 MG tablet Half tab PO qHS x 1  week, then half tab PO BID x 1 week, then half tab PO in AM and full pill in PM x 7 days, then 1 tab BID     Family History     Problem Relation (Age of Onset)    Alzheimer's disease Paternal Grandfather    COPD Paternal Grandmother    Hepatitis Father    Migraines Father    Osteoporosis Mother    Stroke Maternal Grandfather        Tobacco Use    Smoking status: Never Smoker    Smokeless tobacco: Never Used   Substance and Sexual Activity    Alcohol use: No    Drug use: No    Sexual activity: Never     Review of Systems   Constitutional: Negative.    HENT: Negative.    Eyes: Positive for photophobia and visual disturbance.   Respiratory: Negative.    Cardiovascular: Negative.    Gastrointestinal: Positive for nausea and vomiting.   Endocrine: Negative.    Genitourinary: Negative.    Musculoskeletal: Negative.    Skin: Negative.    Allergic/Immunologic: Negative.    Neurological: Positive for syncope and headaches.   Hematological: Negative.    Psychiatric/Behavioral: Negative.      Objective:     Vital Signs (Most Recent):  Temp: 97.6 °F (36.4 °C) (03/26/19 1031)  Pulse: 79 (03/26/19 1031)  Resp: 16 (03/26/19 1031)  BP: 119/74 (03/26/19 1032)  SpO2: 99 % (03/26/19 1031) Vital Signs (24h Range):  Temp:  [97.6 °F (36.4 °C)] 97.6 °F (36.4 °C)  Pulse:  [79] 79  Resp:  [16] 16  SpO2:  [99 %] 99 %  BP: (119)/(74) 119/74     Weight: 72.6 kg (160 lb)  Body mass index is 28.34 kg/m².    Physical Exam   Constitutional: She is oriented to person, place, and time. She appears well-developed and well-nourished.   HENT:   Head: Normocephalic and atraumatic.   Eyes: Pupils are equal, round, and reactive to light. Conjunctivae, EOM and lids are normal.   Neck: Normal range of motion and full passive range of motion without pain. Neck supple.   Cardiovascular: Normal rate and regular rhythm.   Pulmonary/Chest: Effort normal and breath sounds normal.   Abdominal: Soft. Bowel sounds are normal.   Musculoskeletal: Normal range of  motion.   Neurological: She is alert and oriented to person, place, and time. She has normal reflexes. She has a normal Finger-Nose-Finger Test and a normal Heel to Shin Test.   Reflex Scores:       Tricep reflexes are 2+ on the right side and 2+ on the left side.       Bicep reflexes are 2+ on the right side and 2+ on the left side.       Brachioradialis reflexes are 2+ on the right side and 2+ on the left side.       Patellar reflexes are 2+ on the right side and 2+ on the left side.       Achilles reflexes are 2+ on the right side and 2+ on the left side.  Skin: Skin is warm and dry.   Psychiatric: She has a normal mood and affect.       NEUROLOGICAL EXAMINATION:     MENTAL STATUS   Oriented to person, place, and time.   Level of consciousness: alert    CRANIAL NERVES     CN II   Right visual field deficit: right field cut noted   Left visual field deficit: none     CN III, IV, VI   Pupils are equal, round, and reactive to light.  Extraocular motions are normal.     CN V   Right facial sensation deficit: decreased facial sensation (chronic)    Left facial sensation deficit: none    CN VII   Facial expression full, symmetric.     CN VIII   CN VIII normal.     CN XI   CN XI normal.     MOTOR EXAM     Strength   Right neck flexion: 5/5  Left neck flexion: 5/5  Right neck extension: 5/5  Left neck extension: 5/5  Right deltoid: 5/5  Left deltoid: 5/5  Right biceps: 5/5  Left biceps: 5/5  Right triceps: 5/5  Left triceps: 5/5  Right wrist flexion: 5/5  Left wrist flexion: 5/5  Right wrist extension: 5/5  Left wrist extension: 5/5  Right interossei: 5/5  Left interossei: 5/5  Right abdominals: 5/5  Left abdominals: 5/5  Right iliopsoas: 5/5  Left iliopsoas: 5/5  Right quadriceps: 5/5  Left quadriceps: 5/5  Right hamstrin/5  Left hamstrin/5  Right glutei: 5/5  Left glutei: 5/5  Right anterior tibial: 5/5  Left anterior tibial: 5/5  Right posterior tibial: 5/5  Left posterior tibial: 5/5  Right peroneal:  5/5  Left peroneal: 5/5  Right gastroc: 5/5  Left gastroc: 5/5    REFLEXES     Reflexes   Right brachioradialis: 2+  Left brachioradialis: 2+  Right biceps: 2+  Left biceps: 2+  Right triceps: 2+  Left triceps: 2+  Right patellar: 2+  Left patellar: 2+  Right achilles: 2+  Left achilles: 2+  Right : 2+  Left : 2+    SENSORY EXAM   Light touch normal.     GAIT AND COORDINATION      Coordination   Finger to nose coordination: normal  Heel to shin coordination: normal    Tremor   Resting tremor: absent  Intention tremor: absent       Gait not tested       Significant Labs: All pertinent lab results from the past 24 hours have been reviewed.    Significant Imaging: I have reviewed and interpreted all pertinent imaging results/findings within the past 24 hours.

## 2020-06-10 ENCOUNTER — TELEPHONE (OUTPATIENT)
Dept: NEUROLOGY | Facility: CLINIC | Age: 35
End: 2020-06-10

## 2020-06-10 NOTE — TELEPHONE ENCOUNTER
Returned call to pt; no answer; LVM that Joe is not seeing pt's in the clinic; also instructed for pt to get referral to be seen for hand and arm pain.

## 2020-06-10 NOTE — TELEPHONE ENCOUNTER
----- Message from Allison Taylor sent at 6/10/2020  6:26 AM CDT -----  Contact: Pt request via [facility]  Message     Appointment Request From: Amy Orta    With Provider: Cedrick Diaz DO [[facility] - Neurology]    Preferred Date Range: 6/10/2020 - 6/30/2020    Preferred Times: Any time    Reason for visit: Follow up    Comments:  Right hand and arm pain need to get a neurological check up

## 2021-03-02 ENCOUNTER — OFFICE VISIT (OUTPATIENT)
Dept: NEUROLOGY | Facility: CLINIC | Age: 36
End: 2021-03-02
Payer: MEDICARE

## 2021-03-02 ENCOUNTER — LAB VISIT (OUTPATIENT)
Dept: LAB | Facility: HOSPITAL | Age: 36
End: 2021-03-02
Attending: PSYCHIATRY & NEUROLOGY
Payer: MEDICARE

## 2021-03-02 ENCOUNTER — TELEPHONE (OUTPATIENT)
Dept: NEUROLOGY | Facility: CLINIC | Age: 36
End: 2021-03-02

## 2021-03-02 VITALS
RESPIRATION RATE: 17 BRPM | HEIGHT: 63 IN | SYSTOLIC BLOOD PRESSURE: 128 MMHG | WEIGHT: 198 LBS | BODY MASS INDEX: 35.08 KG/M2 | TEMPERATURE: 99 F | HEART RATE: 65 BPM | DIASTOLIC BLOOD PRESSURE: 90 MMHG

## 2021-03-02 DIAGNOSIS — R53.83 FATIGUE, UNSPECIFIED TYPE: ICD-10-CM

## 2021-03-02 DIAGNOSIS — G44.40 MEDICATION OVERUSE HEADACHE: ICD-10-CM

## 2021-03-02 DIAGNOSIS — Z86.73 CHRONIC ISCHEMIC LEFT MCA STROKE: ICD-10-CM

## 2021-03-02 DIAGNOSIS — G43.901 STATUS MIGRAINOSUS: ICD-10-CM

## 2021-03-02 DIAGNOSIS — G43.719 INTRACTABLE CHRONIC MIGRAINE WITHOUT AURA AND WITHOUT STATUS MIGRAINOSUS: ICD-10-CM

## 2021-03-02 DIAGNOSIS — G43.719 INTRACTABLE CHRONIC MIGRAINE WITHOUT AURA AND WITHOUT STATUS MIGRAINOSUS: Primary | ICD-10-CM

## 2021-03-02 PROCEDURE — 99205 OFFICE O/P NEW HI 60 MIN: CPT | Mod: S$PBB,,, | Performed by: PSYCHIATRY & NEUROLOGY

## 2021-03-02 PROCEDURE — 80053 COMPREHEN METABOLIC PANEL: CPT

## 2021-03-02 PROCEDURE — 85025 COMPLETE CBC W/AUTO DIFF WBC: CPT

## 2021-03-02 PROCEDURE — 36415 COLL VENOUS BLD VENIPUNCTURE: CPT | Mod: PO

## 2021-03-02 PROCEDURE — 99214 OFFICE O/P EST MOD 30 MIN: CPT | Mod: PBBFAC,PO | Performed by: PSYCHIATRY & NEUROLOGY

## 2021-03-02 PROCEDURE — 84443 ASSAY THYROID STIM HORMONE: CPT

## 2021-03-02 PROCEDURE — 99999 PR PBB SHADOW E&M-EST. PATIENT-LVL IV: ICD-10-PCS | Mod: PBBFAC,,, | Performed by: PSYCHIATRY & NEUROLOGY

## 2021-03-02 PROCEDURE — 99999 PR PBB SHADOW E&M-EST. PATIENT-LVL IV: CPT | Mod: PBBFAC,,, | Performed by: PSYCHIATRY & NEUROLOGY

## 2021-03-02 PROCEDURE — 99205 PR OFFICE/OUTPT VISIT, NEW, LEVL V, 60-74 MIN: ICD-10-PCS | Mod: S$PBB,,, | Performed by: PSYCHIATRY & NEUROLOGY

## 2021-03-02 RX ORDER — AMITRIPTYLINE HYDROCHLORIDE 50 MG/1
50 TABLET, FILM COATED ORAL NIGHTLY
COMMUNITY
End: 2021-09-10

## 2021-03-02 RX ORDER — DIVALPROEX SODIUM 250 MG/1
TABLET, FILM COATED, EXTENDED RELEASE ORAL
Qty: 13 TABLET | Refills: 0 | Status: SHIPPED | OUTPATIENT
Start: 2021-03-02 | End: 2021-04-30

## 2021-03-02 RX ORDER — DEXAMETHASONE 4 MG/1
TABLET ORAL
Qty: 6 TABLET | Refills: 0 | Status: SHIPPED | OUTPATIENT
Start: 2021-03-02 | End: 2021-04-30

## 2021-03-02 RX ORDER — UBROGEPANT 50 MG/1
50 TABLET ORAL ONCE AS NEEDED
Qty: 10 TABLET | Refills: 2 | Status: SHIPPED | OUTPATIENT
Start: 2021-03-02 | End: 2021-03-02

## 2021-03-02 RX ORDER — PROPRANOLOL HYDROCHLORIDE 80 MG/1
80 CAPSULE, EXTENDED RELEASE ORAL DAILY
COMMUNITY
End: 2021-10-14 | Stop reason: SDUPTHER

## 2021-03-02 RX ORDER — BUTALBITAL, ACETAMINOPHEN AND CAFFEINE 50; 325; 40 MG/1; MG/1; MG/1
1 TABLET ORAL EVERY 4 HOURS PRN
COMMUNITY
End: 2021-04-30

## 2021-03-02 RX ORDER — PROCHLORPERAZINE MALEATE 10 MG
10 TABLET ORAL 3 TIMES DAILY PRN
Qty: 14 TABLET | Refills: 3 | Status: SHIPPED | OUTPATIENT
Start: 2021-03-02 | End: 2022-09-21 | Stop reason: SDUPTHER

## 2021-03-03 LAB
ALBUMIN SERPL BCP-MCNC: 4 G/DL (ref 3.5–5.2)
ALP SERPL-CCNC: 113 U/L (ref 55–135)
ALT SERPL W/O P-5'-P-CCNC: 12 U/L (ref 10–44)
ANION GAP SERPL CALC-SCNC: 9 MMOL/L (ref 8–16)
ANISOCYTOSIS BLD QL SMEAR: SLIGHT
AST SERPL-CCNC: 17 U/L (ref 10–40)
BASOPHILS # BLD AUTO: 0.04 K/UL (ref 0–0.2)
BASOPHILS NFR BLD: 0.6 % (ref 0–1.9)
BILIRUB SERPL-MCNC: 0.2 MG/DL (ref 0.1–1)
BUN SERPL-MCNC: 14 MG/DL (ref 6–20)
CALCIUM SERPL-MCNC: 9.2 MG/DL (ref 8.7–10.5)
CHLORIDE SERPL-SCNC: 106 MMOL/L (ref 95–110)
CO2 SERPL-SCNC: 22 MMOL/L (ref 23–29)
CREAT SERPL-MCNC: 0.9 MG/DL (ref 0.5–1.4)
DACRYOCYTES BLD QL SMEAR: ABNORMAL
DIFFERENTIAL METHOD: ABNORMAL
EOSINOPHIL # BLD AUTO: 0.2 K/UL (ref 0–0.5)
EOSINOPHIL NFR BLD: 2.3 % (ref 0–8)
ERYTHROCYTE [DISTWIDTH] IN BLOOD BY AUTOMATED COUNT: 12.1 % (ref 11.5–14.5)
EST. GFR  (AFRICAN AMERICAN): >60 ML/MIN/1.73 M^2
EST. GFR  (NON AFRICAN AMERICAN): >60 ML/MIN/1.73 M^2
GIANT PLATELETS BLD QL SMEAR: PRESENT
GLUCOSE SERPL-MCNC: 61 MG/DL (ref 70–110)
HCT VFR BLD AUTO: 37.9 % (ref 37–48.5)
HGB BLD-MCNC: 12.3 G/DL (ref 12–16)
IMM GRANULOCYTES # BLD AUTO: 0.02 K/UL (ref 0–0.04)
IMM GRANULOCYTES NFR BLD AUTO: 0.3 % (ref 0–0.5)
LYMPHOCYTES # BLD AUTO: 2.8 K/UL (ref 1–4.8)
LYMPHOCYTES NFR BLD: 39.8 % (ref 18–48)
MCH RBC QN AUTO: 29.9 PG (ref 27–31)
MCHC RBC AUTO-ENTMCNC: 32.5 G/DL (ref 32–36)
MCV RBC AUTO: 92 FL (ref 82–98)
MONOCYTES # BLD AUTO: 0.4 K/UL (ref 0.3–1)
MONOCYTES NFR BLD: 6.2 % (ref 4–15)
NEUTROPHILS # BLD AUTO: 3.5 K/UL (ref 1.8–7.7)
NEUTROPHILS NFR BLD: 50.8 % (ref 38–73)
NRBC BLD-RTO: 0 /100 WBC
OVALOCYTES BLD QL SMEAR: ABNORMAL
PLATELET # BLD AUTO: 477 K/UL (ref 150–350)
PLATELET BLD QL SMEAR: ABNORMAL
PMV BLD AUTO: 9.6 FL (ref 9.2–12.9)
POIKILOCYTOSIS BLD QL SMEAR: SLIGHT
POTASSIUM SERPL-SCNC: 4.5 MMOL/L (ref 3.5–5.1)
PROT SERPL-MCNC: 7.7 G/DL (ref 6–8.4)
RBC # BLD AUTO: 4.12 M/UL (ref 4–5.4)
SODIUM SERPL-SCNC: 137 MMOL/L (ref 136–145)
TSH SERPL DL<=0.005 MIU/L-ACNC: 3.5 UIU/ML (ref 0.4–4)
WBC # BLD AUTO: 6.94 K/UL (ref 3.9–12.7)

## 2021-03-05 ENCOUNTER — TELEPHONE (OUTPATIENT)
Dept: PHARMACY | Facility: CLINIC | Age: 36
End: 2021-03-05

## 2021-03-09 ENCOUNTER — TELEPHONE (OUTPATIENT)
Dept: PHARMACY | Facility: AMBULARY SURGERY CENTER | Age: 36
End: 2021-03-09

## 2021-03-18 ENCOUNTER — PATIENT MESSAGE (OUTPATIENT)
Dept: NEUROLOGY | Facility: CLINIC | Age: 36
End: 2021-03-18

## 2021-03-19 ENCOUNTER — PROCEDURE VISIT (OUTPATIENT)
Dept: NEUROLOGY | Facility: CLINIC | Age: 36
End: 2021-03-19
Payer: MEDICARE

## 2021-03-19 VITALS
HEIGHT: 63 IN | WEIGHT: 201.19 LBS | RESPIRATION RATE: 17 BRPM | DIASTOLIC BLOOD PRESSURE: 78 MMHG | TEMPERATURE: 99 F | SYSTOLIC BLOOD PRESSURE: 113 MMHG | HEART RATE: 67 BPM | BODY MASS INDEX: 35.65 KG/M2

## 2021-03-19 DIAGNOSIS — G43.719 INTRACTABLE CHRONIC MIGRAINE WITHOUT AURA AND WITHOUT STATUS MIGRAINOSUS: Primary | ICD-10-CM

## 2021-03-19 PROCEDURE — 64615 PR CHEMODENERVATION OF MUSCLE FOR CHRONIC MIGRAINE: ICD-10-PCS | Mod: S$PBB,,, | Performed by: PSYCHIATRY & NEUROLOGY

## 2021-03-19 PROCEDURE — 64615 CHEMODENERV MUSC MIGRAINE: CPT | Mod: PBBFAC,PO | Performed by: PSYCHIATRY & NEUROLOGY

## 2021-03-19 PROCEDURE — 64615 CHEMODENERV MUSC MIGRAINE: CPT | Mod: S$PBB,,, | Performed by: PSYCHIATRY & NEUROLOGY

## 2021-03-19 RX ADMIN — ONABOTULINUMTOXINA 200 UNITS: 100 INJECTION, POWDER, LYOPHILIZED, FOR SOLUTION INTRADERMAL; INTRAMUSCULAR at 01:03

## 2021-04-30 ENCOUNTER — PATIENT MESSAGE (OUTPATIENT)
Dept: NEUROLOGY | Facility: CLINIC | Age: 36
End: 2021-04-30

## 2021-04-30 ENCOUNTER — OFFICE VISIT (OUTPATIENT)
Dept: NEUROLOGY | Facility: CLINIC | Age: 36
End: 2021-04-30
Payer: MEDICARE

## 2021-04-30 DIAGNOSIS — G43.719 INTRACTABLE CHRONIC MIGRAINE WITHOUT AURA AND WITHOUT STATUS MIGRAINOSUS: Primary | ICD-10-CM

## 2021-04-30 DIAGNOSIS — G44.40 MEDICATION OVERUSE HEADACHE: ICD-10-CM

## 2021-04-30 PROCEDURE — 99214 OFFICE O/P EST MOD 30 MIN: CPT | Mod: 95,,, | Performed by: PSYCHIATRY & NEUROLOGY

## 2021-04-30 PROCEDURE — 99214 PR OFFICE/OUTPT VISIT, EST, LEVL IV, 30-39 MIN: ICD-10-PCS | Mod: 95,,, | Performed by: PSYCHIATRY & NEUROLOGY

## 2021-06-11 ENCOUNTER — PROCEDURE VISIT (OUTPATIENT)
Dept: NEUROLOGY | Facility: CLINIC | Age: 36
End: 2021-06-11
Payer: MEDICARE

## 2021-06-11 VITALS
HEIGHT: 63 IN | HEART RATE: 68 BPM | DIASTOLIC BLOOD PRESSURE: 81 MMHG | SYSTOLIC BLOOD PRESSURE: 115 MMHG | TEMPERATURE: 98 F | BODY MASS INDEX: 35.61 KG/M2 | WEIGHT: 201 LBS

## 2021-06-11 DIAGNOSIS — G43.719 INTRACTABLE CHRONIC MIGRAINE WITHOUT AURA AND WITHOUT STATUS MIGRAINOSUS: Primary | ICD-10-CM

## 2021-06-11 PROCEDURE — 64615 PR CHEMODENERVATION OF MUSCLE FOR CHRONIC MIGRAINE: ICD-10-PCS | Mod: S$PBB,,, | Performed by: PSYCHIATRY & NEUROLOGY

## 2021-06-11 PROCEDURE — 64615 CHEMODENERV MUSC MIGRAINE: CPT | Mod: PBBFAC,PO | Performed by: PSYCHIATRY & NEUROLOGY

## 2021-06-11 PROCEDURE — 64615 CHEMODENERV MUSC MIGRAINE: CPT | Mod: S$PBB,,, | Performed by: PSYCHIATRY & NEUROLOGY

## 2021-06-11 RX ADMIN — ONABOTULINUMTOXINA 200 UNITS: 100 INJECTION, POWDER, LYOPHILIZED, FOR SOLUTION INTRADERMAL; INTRAMUSCULAR at 11:06

## 2021-07-12 ENCOUNTER — PATIENT MESSAGE (OUTPATIENT)
Dept: NEUROLOGY | Facility: CLINIC | Age: 36
End: 2021-07-12

## 2021-08-12 ENCOUNTER — PATIENT MESSAGE (OUTPATIENT)
Dept: NEUROLOGY | Facility: CLINIC | Age: 36
End: 2021-08-12

## 2021-08-12 RX ORDER — GABAPENTIN 300 MG/1
CAPSULE ORAL
Qty: 90 CAPSULE | Refills: 2 | Status: CANCELLED | OUTPATIENT
Start: 2021-08-12

## 2021-08-20 DIAGNOSIS — G43.719 INTRACTABLE CHRONIC MIGRAINE WITHOUT AURA AND WITHOUT STATUS MIGRAINOSUS: Primary | ICD-10-CM

## 2021-08-27 RX ORDER — ONDANSETRON 4 MG/1
4 TABLET, ORALLY DISINTEGRATING ORAL EVERY 8 HOURS PRN
Qty: 10 TABLET | Refills: 3 | Status: SHIPPED | OUTPATIENT
Start: 2021-08-27 | End: 2022-09-21 | Stop reason: SDUPTHER

## 2021-09-01 ENCOUNTER — TELEPHONE (OUTPATIENT)
Dept: NEUROLOGY | Facility: CLINIC | Age: 36
End: 2021-09-01

## 2021-09-01 ENCOUNTER — PATIENT MESSAGE (OUTPATIENT)
Dept: NEUROLOGY | Facility: CLINIC | Age: 36
End: 2021-09-01

## 2021-09-02 ENCOUNTER — PATIENT MESSAGE (OUTPATIENT)
Dept: NEUROLOGY | Facility: CLINIC | Age: 36
End: 2021-09-02

## 2021-09-10 ENCOUNTER — PROCEDURE VISIT (OUTPATIENT)
Dept: NEUROLOGY | Facility: CLINIC | Age: 36
End: 2021-09-10
Payer: MEDICARE

## 2021-09-10 VITALS
WEIGHT: 186.06 LBS | HEART RATE: 66 BPM | BODY MASS INDEX: 32.97 KG/M2 | SYSTOLIC BLOOD PRESSURE: 109 MMHG | TEMPERATURE: 98 F | DIASTOLIC BLOOD PRESSURE: 77 MMHG | HEIGHT: 63 IN | RESPIRATION RATE: 17 BRPM

## 2021-09-10 DIAGNOSIS — G43.719 INTRACTABLE CHRONIC MIGRAINE WITHOUT AURA AND WITHOUT STATUS MIGRAINOSUS: Primary | ICD-10-CM

## 2021-09-10 PROCEDURE — 64615 CHEMODENERV MUSC MIGRAINE: CPT | Mod: PBBFAC,PO | Performed by: PSYCHIATRY & NEUROLOGY

## 2021-09-10 PROCEDURE — 64615 PR CHEMODENERVATION OF MUSCLE FOR CHRONIC MIGRAINE: ICD-10-PCS | Mod: S$PBB,,, | Performed by: PSYCHIATRY & NEUROLOGY

## 2021-09-10 PROCEDURE — 64615 CHEMODENERV MUSC MIGRAINE: CPT | Mod: S$PBB,,, | Performed by: PSYCHIATRY & NEUROLOGY

## 2021-09-10 RX ORDER — GABAPENTIN 300 MG/1
600 CAPSULE ORAL 2 TIMES DAILY
Qty: 120 CAPSULE | Refills: 11 | Status: SHIPPED | OUTPATIENT
Start: 2021-09-10 | End: 2022-09-14 | Stop reason: SDUPTHER

## 2021-09-10 RX ORDER — VERAPAMIL HYDROCHLORIDE 40 MG/1
TABLET ORAL
COMMUNITY
Start: 2019-06-12 | End: 2021-09-10

## 2021-09-10 RX ORDER — CHOLECALCIFEROL (VITAMIN D3) 25 MCG
1000 TABLET ORAL
COMMUNITY
End: 2022-12-22

## 2021-09-10 RX ORDER — BUTALBITAL, ACETAMINOPHEN AND CAFFEINE 50; 325; 40 MG/1; MG/1; MG/1
TABLET ORAL
COMMUNITY
Start: 2018-11-13 | End: 2021-10-18

## 2021-09-10 RX ORDER — PROMETHAZINE HYDROCHLORIDE 25 MG/1
TABLET ORAL
COMMUNITY
Start: 2019-03-28 | End: 2022-09-21

## 2021-09-10 RX ORDER — CLOPIDOGREL BISULFATE 75 MG/1
75 TABLET ORAL DAILY
Qty: 90 TABLET | Refills: 3 | Status: SHIPPED | OUTPATIENT
Start: 2021-09-10 | End: 2022-10-12 | Stop reason: SDUPTHER

## 2021-09-10 RX ORDER — UBROGEPANT 50 MG/1
TABLET ORAL
COMMUNITY
Start: 2021-04-04 | End: 2021-12-07 | Stop reason: SDUPTHER

## 2021-09-10 RX ADMIN — ONABOTULINUMTOXINA 200 UNITS: 100 INJECTION, POWDER, LYOPHILIZED, FOR SOLUTION INTRADERMAL; INTRAMUSCULAR at 01:09

## 2021-09-27 ENCOUNTER — OFFICE VISIT (OUTPATIENT)
Dept: URGENT CARE | Facility: CLINIC | Age: 36
End: 2021-09-27
Payer: MEDICARE

## 2021-09-27 VITALS
HEART RATE: 73 BPM | DIASTOLIC BLOOD PRESSURE: 64 MMHG | OXYGEN SATURATION: 100 % | SYSTOLIC BLOOD PRESSURE: 93 MMHG | TEMPERATURE: 97 F | RESPIRATION RATE: 17 BRPM

## 2021-09-27 DIAGNOSIS — J02.9 SORE THROAT: ICD-10-CM

## 2021-09-27 DIAGNOSIS — J02.9 ACUTE PHARYNGITIS, UNSPECIFIED ETIOLOGY: Primary | ICD-10-CM

## 2021-09-27 LAB
CTP QC/QA: YES
CTP QC/QA: YES
S PYO RRNA THROAT QL PROBE: NEGATIVE
SARS-COV-2 AG RESP QL IA.RAPID: NEGATIVE

## 2021-09-27 PROCEDURE — 96372 PR INJECTION,THERAP/PROPH/DIAG2ST, IM OR SUBCUT: ICD-10-PCS | Mod: S$GLB,,, | Performed by: STUDENT IN AN ORGANIZED HEALTH CARE EDUCATION/TRAINING PROGRAM

## 2021-09-27 PROCEDURE — 87426 SARSCOV CORONAVIRUS AG IA: CPT | Mod: QW,CR,S$GLB, | Performed by: STUDENT IN AN ORGANIZED HEALTH CARE EDUCATION/TRAINING PROGRAM

## 2021-09-27 PROCEDURE — 87880 STREP A ASSAY W/OPTIC: CPT | Mod: QW,,, | Performed by: STUDENT IN AN ORGANIZED HEALTH CARE EDUCATION/TRAINING PROGRAM

## 2021-09-27 PROCEDURE — 87426 SARS CORONAVIRUS 2 ANTIGEN POCT: ICD-10-PCS | Mod: QW,CR,S$GLB, | Performed by: STUDENT IN AN ORGANIZED HEALTH CARE EDUCATION/TRAINING PROGRAM

## 2021-09-27 PROCEDURE — 87880 POCT RAPID STREP A: ICD-10-PCS | Mod: QW,,, | Performed by: STUDENT IN AN ORGANIZED HEALTH CARE EDUCATION/TRAINING PROGRAM

## 2021-09-27 PROCEDURE — 99203 PR OFFICE/OUTPT VISIT, NEW, LEVL III, 30-44 MIN: ICD-10-PCS | Mod: 25,CS,S$GLB, | Performed by: STUDENT IN AN ORGANIZED HEALTH CARE EDUCATION/TRAINING PROGRAM

## 2021-09-27 PROCEDURE — 96372 THER/PROPH/DIAG INJ SC/IM: CPT | Mod: S$GLB,,, | Performed by: STUDENT IN AN ORGANIZED HEALTH CARE EDUCATION/TRAINING PROGRAM

## 2021-09-27 PROCEDURE — 99203 OFFICE O/P NEW LOW 30 MIN: CPT | Mod: 25,CS,S$GLB, | Performed by: STUDENT IN AN ORGANIZED HEALTH CARE EDUCATION/TRAINING PROGRAM

## 2021-09-27 RX ORDER — DEXAMETHASONE SODIUM PHOSPHATE 4 MG/ML
8 INJECTION, SOLUTION INTRA-ARTICULAR; INTRALESIONAL; INTRAMUSCULAR; INTRAVENOUS; SOFT TISSUE
Status: COMPLETED | OUTPATIENT
Start: 2021-09-27 | End: 2021-09-27

## 2021-09-27 RX ORDER — PENICILLIN V POTASSIUM 500 MG/1
500 TABLET, FILM COATED ORAL EVERY 8 HOURS
Qty: 30 TABLET | Refills: 0 | Status: SHIPPED | OUTPATIENT
Start: 2021-09-27 | End: 2021-10-07

## 2021-09-27 RX ADMIN — DEXAMETHASONE SODIUM PHOSPHATE 8 MG: 4 INJECTION, SOLUTION INTRA-ARTICULAR; INTRALESIONAL; INTRAMUSCULAR; INTRAVENOUS; SOFT TISSUE at 10:09

## 2021-09-30 LAB — S PYO THROAT QL CULT: NEGATIVE

## 2021-10-14 ENCOUNTER — PATIENT MESSAGE (OUTPATIENT)
Dept: NEUROLOGY | Facility: CLINIC | Age: 36
End: 2021-10-14

## 2021-10-14 ENCOUNTER — PATIENT MESSAGE (OUTPATIENT)
Dept: NEUROLOGY | Facility: CLINIC | Age: 36
End: 2021-10-14
Payer: MEDICARE

## 2021-10-14 DIAGNOSIS — G43.719 INTRACTABLE CHRONIC MIGRAINE WITHOUT AURA AND WITHOUT STATUS MIGRAINOSUS: Primary | ICD-10-CM

## 2021-10-15 RX ORDER — PROPRANOLOL HYDROCHLORIDE 80 MG/1
80 CAPSULE, EXTENDED RELEASE ORAL DAILY
Qty: 30 CAPSULE | Refills: 5 | Status: SHIPPED | OUTPATIENT
Start: 2021-10-15 | End: 2021-12-07 | Stop reason: SDUPTHER

## 2021-10-18 ENCOUNTER — HOSPITAL ENCOUNTER (EMERGENCY)
Facility: HOSPITAL | Age: 36
Discharge: HOME | End: 2021-10-18
Attending: EMERGENCY MEDICINE
Payer: MEDICARE

## 2021-10-18 VITALS
TEMPERATURE: 99 F | DIASTOLIC BLOOD PRESSURE: 72 MMHG | HEIGHT: 63 IN | WEIGHT: 180 LBS | RESPIRATION RATE: 20 BRPM | OXYGEN SATURATION: 99 % | HEART RATE: 54 BPM | SYSTOLIC BLOOD PRESSURE: 128 MMHG | BODY MASS INDEX: 31.89 KG/M2

## 2021-10-18 DIAGNOSIS — G43.109 COMPLICATED MIGRAINE: Primary | ICD-10-CM

## 2021-10-18 LAB
ALBUMIN SERPL BCP-MCNC: 4.2 G/DL (ref 3.5–5.2)
ALP SERPL-CCNC: 123 U/L (ref 55–135)
ALT SERPL W/O P-5'-P-CCNC: 29 U/L (ref 10–44)
ANION GAP SERPL CALC-SCNC: 9 MMOL/L (ref 8–16)
APTT PPP: 36.6 SEC (ref 23.3–35.1)
AST SERPL-CCNC: 19 U/L (ref 10–40)
BASOPHILS # BLD AUTO: 0.03 K/UL (ref 0–0.2)
BASOPHILS NFR BLD: 0.4 % (ref 0–1.9)
BILIRUB SERPL-MCNC: 0.6 MG/DL (ref 0.1–1)
BUN SERPL-MCNC: 12 MG/DL (ref 6–20)
CALCIUM SERPL-MCNC: 9.7 MG/DL (ref 8.7–10.5)
CHLORIDE SERPL-SCNC: 107 MMOL/L (ref 95–110)
CHOLEST SERPL-MCNC: 139 MG/DL (ref 120–199)
CHOLEST/HDLC SERPL: 2.8 {RATIO} (ref 2–5)
CO2 SERPL-SCNC: 26 MMOL/L (ref 23–29)
CREAT SERPL-MCNC: 0.8 MG/DL (ref 0.5–1.4)
CREAT SERPL-MCNC: 0.9 MG/DL (ref 0.5–1.4)
DIFFERENTIAL METHOD: ABNORMAL
EOSINOPHIL # BLD AUTO: 0.2 K/UL (ref 0–0.5)
EOSINOPHIL NFR BLD: 2.2 % (ref 0–8)
ERYTHROCYTE [DISTWIDTH] IN BLOOD BY AUTOMATED COUNT: 12.3 % (ref 11.5–14.5)
EST. GFR  (AFRICAN AMERICAN): >60 ML/MIN/1.73 M^2
EST. GFR  (NON AFRICAN AMERICAN): >60 ML/MIN/1.73 M^2
GLUCOSE SERPL-MCNC: 90 MG/DL (ref 70–110)
GLUCOSE SERPL-MCNC: 91 MG/DL (ref 70–110)
HCT VFR BLD AUTO: 36.6 % (ref 37–48.5)
HDLC SERPL-MCNC: 50 MG/DL (ref 40–75)
HDLC SERPL: 36 % (ref 20–50)
HGB BLD-MCNC: 12 G/DL (ref 12–16)
IMM GRANULOCYTES # BLD AUTO: 0.02 K/UL (ref 0–0.04)
IMM GRANULOCYTES NFR BLD AUTO: 0.2 % (ref 0–0.5)
INR PPP: 1.1
LDLC SERPL CALC-MCNC: 77 MG/DL (ref 63–159)
LYMPHOCYTES # BLD AUTO: 2.2 K/UL (ref 1–4.8)
LYMPHOCYTES NFR BLD: 27.3 % (ref 18–48)
MCH RBC QN AUTO: 29.6 PG (ref 27–31)
MCHC RBC AUTO-ENTMCNC: 32.8 G/DL (ref 32–36)
MCV RBC AUTO: 90 FL (ref 82–98)
MONOCYTES # BLD AUTO: 0.5 K/UL (ref 0.3–1)
MONOCYTES NFR BLD: 6.6 % (ref 4–15)
NEUTROPHILS # BLD AUTO: 5.2 K/UL (ref 1.8–7.7)
NEUTROPHILS NFR BLD: 63.3 % (ref 38–73)
NONHDLC SERPL-MCNC: 89 MG/DL
NRBC BLD-RTO: 0 /100 WBC
PLATELET # BLD AUTO: 463 K/UL (ref 150–450)
PMV BLD AUTO: 9.6 FL (ref 9.2–12.9)
POTASSIUM SERPL-SCNC: 4 MMOL/L (ref 3.5–5.1)
PROT SERPL-MCNC: 8.1 G/DL (ref 6–8.4)
PROTHROMBIN TIME: 13.7 SEC (ref 11.4–13.7)
RBC # BLD AUTO: 4.05 M/UL (ref 4–5.4)
SAMPLE: NORMAL
SODIUM SERPL-SCNC: 142 MMOL/L (ref 136–145)
TRIGL SERPL-MCNC: 60 MG/DL (ref 30–150)
TSH SERPL DL<=0.005 MIU/L-ACNC: 3.67 UIU/ML (ref 0.34–5.6)
WBC # BLD AUTO: 8.22 K/UL (ref 3.9–12.7)

## 2021-10-18 PROCEDURE — 25500020 PHARM REV CODE 255: Performed by: EMERGENCY MEDICINE

## 2021-10-18 PROCEDURE — 93010 ELECTROCARDIOGRAM REPORT: CPT | Mod: ,,, | Performed by: INTERNAL MEDICINE

## 2021-10-18 PROCEDURE — 85610 PROTHROMBIN TIME: CPT | Performed by: EMERGENCY MEDICINE

## 2021-10-18 PROCEDURE — 93005 ELECTROCARDIOGRAM TRACING: CPT | Performed by: INTERNAL MEDICINE

## 2021-10-18 PROCEDURE — 85730 THROMBOPLASTIN TIME PARTIAL: CPT | Performed by: EMERGENCY MEDICINE

## 2021-10-18 PROCEDURE — 96374 THER/PROPH/DIAG INJ IV PUSH: CPT | Mod: 59

## 2021-10-18 PROCEDURE — 84443 ASSAY THYROID STIM HORMONE: CPT | Performed by: EMERGENCY MEDICINE

## 2021-10-18 PROCEDURE — 80061 LIPID PANEL: CPT | Performed by: EMERGENCY MEDICINE

## 2021-10-18 PROCEDURE — 80053 COMPREHEN METABOLIC PANEL: CPT | Performed by: EMERGENCY MEDICINE

## 2021-10-18 PROCEDURE — 93010 EKG 12-LEAD: ICD-10-PCS | Mod: ,,, | Performed by: INTERNAL MEDICINE

## 2021-10-18 PROCEDURE — 82962 GLUCOSE BLOOD TEST: CPT

## 2021-10-18 PROCEDURE — 85025 COMPLETE CBC W/AUTO DIFF WBC: CPT | Performed by: EMERGENCY MEDICINE

## 2021-10-18 PROCEDURE — 96375 TX/PRO/DX INJ NEW DRUG ADDON: CPT | Mod: 59

## 2021-10-18 PROCEDURE — 99285 EMERGENCY DEPT VISIT HI MDM: CPT | Mod: 25

## 2021-10-18 PROCEDURE — 63600175 PHARM REV CODE 636 W HCPCS: Performed by: EMERGENCY MEDICINE

## 2021-10-18 RX ORDER — PROCHLORPERAZINE EDISYLATE 5 MG/ML
10 INJECTION INTRAMUSCULAR; INTRAVENOUS
Status: COMPLETED | OUTPATIENT
Start: 2021-10-18 | End: 2021-10-18

## 2021-10-18 RX ORDER — DIPHENHYDRAMINE HYDROCHLORIDE 50 MG/ML
50 INJECTION INTRAMUSCULAR; INTRAVENOUS
Status: COMPLETED | OUTPATIENT
Start: 2021-10-18 | End: 2021-10-18

## 2021-10-18 RX ORDER — PROCHLORPERAZINE MALEATE 10 MG
10 TABLET ORAL EVERY 6 HOURS PRN
Qty: 15 TABLET | Refills: 0 | Status: SHIPPED | OUTPATIENT
Start: 2021-10-18 | End: 2022-09-21

## 2021-10-18 RX ORDER — KETOROLAC TROMETHAMINE 30 MG/ML
15 INJECTION, SOLUTION INTRAMUSCULAR; INTRAVENOUS
Status: COMPLETED | OUTPATIENT
Start: 2021-10-18 | End: 2021-10-18

## 2021-10-18 RX ADMIN — DIPHENHYDRAMINE HYDROCHLORIDE 50 MG: 50 INJECTION INTRAMUSCULAR; INTRAVENOUS at 09:10

## 2021-10-18 RX ADMIN — KETOROLAC TROMETHAMINE 15 MG: 30 INJECTION, SOLUTION INTRAMUSCULAR; INTRAVENOUS at 10:10

## 2021-10-18 RX ADMIN — PROCHLORPERAZINE EDISYLATE 10 MG: 5 INJECTION INTRAMUSCULAR; INTRAVENOUS at 09:10

## 2021-10-18 RX ADMIN — IOHEXOL 100 ML: 350 INJECTION, SOLUTION INTRAVENOUS at 07:10

## 2021-12-07 ENCOUNTER — PROCEDURE VISIT (OUTPATIENT)
Dept: NEUROLOGY | Facility: CLINIC | Age: 36
End: 2021-12-07
Payer: MEDICARE

## 2021-12-07 VITALS
DIASTOLIC BLOOD PRESSURE: 81 MMHG | SYSTOLIC BLOOD PRESSURE: 119 MMHG | RESPIRATION RATE: 17 BRPM | BODY MASS INDEX: 32.32 KG/M2 | TEMPERATURE: 98 F | WEIGHT: 182.44 LBS | HEART RATE: 60 BPM | HEIGHT: 63 IN

## 2021-12-07 DIAGNOSIS — G43.719 INTRACTABLE CHRONIC MIGRAINE WITHOUT AURA AND WITHOUT STATUS MIGRAINOSUS: Primary | ICD-10-CM

## 2021-12-07 PROCEDURE — 64615 PR CHEMODENERVATION OF MUSCLE FOR CHRONIC MIGRAINE: ICD-10-PCS | Mod: S$PBB,,, | Performed by: PSYCHIATRY & NEUROLOGY

## 2021-12-07 PROCEDURE — 64615 CHEMODENERV MUSC MIGRAINE: CPT | Mod: S$PBB,,, | Performed by: PSYCHIATRY & NEUROLOGY

## 2021-12-07 PROCEDURE — 64615 CHEMODENERV MUSC MIGRAINE: CPT | Mod: PBBFAC,PO | Performed by: PSYCHIATRY & NEUROLOGY

## 2021-12-07 RX ORDER — PROPRANOLOL HYDROCHLORIDE 80 MG/1
80 CAPSULE, EXTENDED RELEASE ORAL DAILY
Qty: 30 CAPSULE | Refills: 11 | Status: SHIPPED | OUTPATIENT
Start: 2021-12-07 | End: 2022-06-14

## 2021-12-07 RX ORDER — UBROGEPANT 50 MG/1
50 TABLET ORAL ONCE AS NEEDED
Qty: 10 TABLET | Refills: 11 | Status: SHIPPED | OUTPATIENT
Start: 2021-12-07 | End: 2021-12-07

## 2021-12-07 RX ADMIN — ONABOTULINUMTOXINA 200 UNITS: 100 INJECTION, POWDER, LYOPHILIZED, FOR SOLUTION INTRADERMAL; INTRAMUSCULAR at 01:12

## 2021-12-08 ENCOUNTER — TELEPHONE (OUTPATIENT)
Dept: PHARMACY | Facility: CLINIC | Age: 36
End: 2021-12-08
Payer: MEDICARE

## 2021-12-10 ENCOUNTER — TELEPHONE (OUTPATIENT)
Dept: NEUROLOGY | Facility: CLINIC | Age: 36
End: 2021-12-10
Payer: MEDICARE

## 2021-12-10 DIAGNOSIS — G43.719 INTRACTABLE CHRONIC MIGRAINE WITHOUT AURA AND WITHOUT STATUS MIGRAINOSUS: Primary | ICD-10-CM

## 2022-03-04 ENCOUNTER — PROCEDURE VISIT (OUTPATIENT)
Dept: NEUROLOGY | Facility: CLINIC | Age: 37
End: 2022-03-04
Payer: MEDICARE

## 2022-03-04 VITALS
HEART RATE: 62 BPM | WEIGHT: 182 LBS | RESPIRATION RATE: 17 BRPM | HEIGHT: 63 IN | SYSTOLIC BLOOD PRESSURE: 101 MMHG | TEMPERATURE: 99 F | BODY MASS INDEX: 32.25 KG/M2 | DIASTOLIC BLOOD PRESSURE: 69 MMHG

## 2022-03-04 DIAGNOSIS — G43.719 INTRACTABLE CHRONIC MIGRAINE WITHOUT AURA AND WITHOUT STATUS MIGRAINOSUS: Primary | ICD-10-CM

## 2022-03-04 PROCEDURE — 64615 CHEMODENERV MUSC MIGRAINE: CPT | Mod: PBBFAC,PO | Performed by: PSYCHIATRY & NEUROLOGY

## 2022-03-04 PROCEDURE — 64615 PR CHEMODENERVATION OF MUSCLE FOR CHRONIC MIGRAINE: ICD-10-PCS | Mod: S$PBB,,, | Performed by: PSYCHIATRY & NEUROLOGY

## 2022-03-04 PROCEDURE — 64615 CHEMODENERV MUSC MIGRAINE: CPT | Mod: S$PBB,,, | Performed by: PSYCHIATRY & NEUROLOGY

## 2022-03-04 RX ADMIN — ONABOTULINUMTOXINA 200 UNITS: 100 INJECTION, POWDER, LYOPHILIZED, FOR SOLUTION INTRADERMAL; INTRAMUSCULAR at 02:03

## 2022-03-04 NOTE — PROCEDURES
Procedures   BOTOX PROCEDURE    PROCEDURE PERFORMED: Botulinum toxin injection (25323)    CLINICAL INDICATION: G43.719    Cycle #5     mAy continues to get significant benefit from Botox. No further severe migraine needing ER visit. She denies needing any refills.     A time out was conducted just before the start of the procedure to verify the correct patient and procedure, procedure location, and all relevant critical information.   Signed consent obtained prior to procedure     Conventional methods of treatment but the patient has been unresponsive and refractory.The patient meets criteria for chronic headaches according to the ICHD-III, the patient has more than 15 headaches a month at least 8 of them meet migraine criteria, which last for more than 4 hours a day.     Last Botox injections were on 12/7/21  and  there has been over 50%  improvement in the patient's symptoms. Frequency of treatment is every 12 weeks unless no response to the treatments, at which time we will discontinue the injections.     DESCRIPTION OF PROCEDURE: After obtaining informed consent and under  aseptic technique, a total of 155 units of botulinum toxin type A were   injected in the following muscles: Procerus 5 units,  5 units  bilaterally, frontalis 20 units, temporalis 20 units bilaterally,  occipitalis 15 units, upper cervical paraspinals 10 units bilaterally and trapezius 15 units bilaterally. The patient was given a total of 155 units in 31 sites.    The patient tolerated the procedure well. There were no complications. The patient was given a prescription for repeat treatment in 12 weeks.     Unavoidable waste 45 units    Talisha Johansen MD   Board Certified Neurologist   [facility] Certified Headache Medicine

## 2022-03-25 ENCOUNTER — HOSPITAL ENCOUNTER (EMERGENCY)
Facility: HOSPITAL | Age: 37
Discharge: LEFT BEFORE DISCHARGE | End: 2022-03-25
Attending: EMERGENCY MEDICINE
Payer: MEDICARE

## 2022-03-25 ENCOUNTER — PATIENT OUTREACH (OUTPATIENT)
Dept: EMERGENCY MEDICINE | Facility: HOSPITAL | Age: 37
End: 2022-03-25
Payer: MEDICARE

## 2022-03-25 VITALS
HEIGHT: 63 IN | OXYGEN SATURATION: 99 % | HEART RATE: 74 BPM | TEMPERATURE: 98 F | SYSTOLIC BLOOD PRESSURE: 127 MMHG | WEIGHT: 180 LBS | BODY MASS INDEX: 31.89 KG/M2 | DIASTOLIC BLOOD PRESSURE: 88 MMHG | RESPIRATION RATE: 20 BRPM

## 2022-03-25 DIAGNOSIS — R10.9 ABDOMINAL PAIN: ICD-10-CM

## 2022-03-25 LAB
ALBUMIN SERPL BCP-MCNC: 4.4 G/DL (ref 3.5–5.2)
ALP SERPL-CCNC: 167 U/L (ref 55–135)
ALT SERPL W/O P-5'-P-CCNC: 67 U/L (ref 10–44)
AMPHET+METHAMPHET UR QL: NEGATIVE
ANION GAP SERPL CALC-SCNC: 6 MMOL/L (ref 8–16)
AST SERPL-CCNC: 27 U/L (ref 10–40)
BACTERIA #/AREA URNS HPF: ABNORMAL /HPF
BARBITURATES UR QL SCN>200 NG/ML: NEGATIVE
BASOPHILS # BLD AUTO: 0.02 K/UL (ref 0–0.2)
BASOPHILS NFR BLD: 0.3 % (ref 0–1.9)
BENZODIAZ UR QL SCN>200 NG/ML: NEGATIVE
BILIRUB SERPL-MCNC: 0.5 MG/DL (ref 0.1–1)
BILIRUB UR QL STRIP: NEGATIVE
BNP SERPL-MCNC: 29 PG/ML (ref 0–99)
BUN SERPL-MCNC: 12 MG/DL (ref 6–20)
BZE UR QL SCN: NEGATIVE
CALCIUM SERPL-MCNC: 9.6 MG/DL (ref 8.7–10.5)
CANNABINOIDS UR QL SCN: NEGATIVE
CHLORIDE SERPL-SCNC: 105 MMOL/L (ref 95–110)
CK SERPL-CCNC: 72 U/L (ref 20–180)
CLARITY UR: CLEAR
CO2 SERPL-SCNC: 26 MMOL/L (ref 23–29)
COLOR UR: YELLOW
CREAT SERPL-MCNC: 0.9 MG/DL (ref 0.5–1.4)
CREAT UR-MCNC: 54 MG/DL (ref 15–325)
DIFFERENTIAL METHOD: ABNORMAL
EOSINOPHIL # BLD AUTO: 0.1 K/UL (ref 0–0.5)
EOSINOPHIL NFR BLD: 1.2 % (ref 0–8)
ERYTHROCYTE [DISTWIDTH] IN BLOOD BY AUTOMATED COUNT: 12.8 % (ref 11.5–14.5)
EST. GFR  (AFRICAN AMERICAN): >60 ML/MIN/1.73 M^2
EST. GFR  (NON AFRICAN AMERICAN): >60 ML/MIN/1.73 M^2
GLUCOSE SERPL-MCNC: 91 MG/DL (ref 70–110)
GLUCOSE UR QL STRIP: NEGATIVE
HCT VFR BLD AUTO: 38.7 % (ref 37–48.5)
HGB BLD-MCNC: 12.3 G/DL (ref 12–16)
HGB UR QL STRIP: ABNORMAL
HYALINE CASTS #/AREA URNS LPF: 4 /LPF
IMM GRANULOCYTES # BLD AUTO: 0.04 K/UL (ref 0–0.04)
IMM GRANULOCYTES NFR BLD AUTO: 0.5 % (ref 0–0.5)
KETONES UR QL STRIP: NEGATIVE
LEUKOCYTE ESTERASE UR QL STRIP: ABNORMAL
LIPASE SERPL-CCNC: 38 U/L (ref 4–60)
LYMPHOCYTES # BLD AUTO: 1.9 K/UL (ref 1–4.8)
LYMPHOCYTES NFR BLD: 25.2 % (ref 18–48)
MAGNESIUM SERPL-MCNC: 2.2 MG/DL (ref 1.6–2.6)
MCH RBC QN AUTO: 29.6 PG (ref 27–31)
MCHC RBC AUTO-ENTMCNC: 31.8 G/DL (ref 32–36)
MCV RBC AUTO: 93 FL (ref 82–98)
MICROSCOPIC COMMENT: ABNORMAL
MONOCYTES # BLD AUTO: 0.4 K/UL (ref 0.3–1)
MONOCYTES NFR BLD: 5.8 % (ref 4–15)
NEUTROPHILS # BLD AUTO: 5.1 K/UL (ref 1.8–7.7)
NEUTROPHILS NFR BLD: 67 % (ref 38–73)
NITRITE UR QL STRIP: NEGATIVE
NRBC BLD-RTO: 0 /100 WBC
OPIATES UR QL SCN: NEGATIVE
PCP UR QL SCN>25 NG/ML: NEGATIVE
PH UR STRIP: 8 [PH] (ref 5–8)
PLATELET # BLD AUTO: 472 K/UL (ref 150–450)
PMV BLD AUTO: 9.4 FL (ref 9.2–12.9)
POTASSIUM SERPL-SCNC: 4.1 MMOL/L (ref 3.5–5.1)
PROT SERPL-MCNC: 8.4 G/DL (ref 6–8.4)
PROT UR QL STRIP: NEGATIVE
RBC # BLD AUTO: 4.16 M/UL (ref 4–5.4)
RBC #/AREA URNS HPF: 5 /HPF (ref 0–4)
SODIUM SERPL-SCNC: 137 MMOL/L (ref 136–145)
SP GR UR STRIP: 1.01 (ref 1–1.03)
SQUAMOUS #/AREA URNS HPF: 2 /HPF
TOXICOLOGY INFORMATION: NORMAL
TROPONIN I SERPL DL<=0.01 NG/ML-MCNC: <0.03 NG/ML
TSH SERPL DL<=0.005 MIU/L-ACNC: 3.08 UIU/ML (ref 0.34–5.6)
URN SPEC COLLECT METH UR: ABNORMAL
UROBILINOGEN UR STRIP-ACNC: NEGATIVE EU/DL
WBC # BLD AUTO: 7.63 K/UL (ref 3.9–12.7)
WBC #/AREA URNS HPF: 1 /HPF (ref 0–5)

## 2022-03-25 PROCEDURE — 80307 DRUG TEST PRSMV CHEM ANLYZR: CPT | Performed by: EMERGENCY MEDICINE

## 2022-03-25 PROCEDURE — 84443 ASSAY THYROID STIM HORMONE: CPT | Performed by: EMERGENCY MEDICINE

## 2022-03-25 PROCEDURE — 36415 COLL VENOUS BLD VENIPUNCTURE: CPT | Performed by: EMERGENCY MEDICINE

## 2022-03-25 PROCEDURE — 81001 URINALYSIS AUTO W/SCOPE: CPT | Mod: 59 | Performed by: PHYSICIAN ASSISTANT

## 2022-03-25 PROCEDURE — 83735 ASSAY OF MAGNESIUM: CPT | Performed by: EMERGENCY MEDICINE

## 2022-03-25 PROCEDURE — 84484 ASSAY OF TROPONIN QUANT: CPT | Performed by: EMERGENCY MEDICINE

## 2022-03-25 PROCEDURE — 80053 COMPREHEN METABOLIC PANEL: CPT | Performed by: PHYSICIAN ASSISTANT

## 2022-03-25 PROCEDURE — 82550 ASSAY OF CK (CPK): CPT | Performed by: EMERGENCY MEDICINE

## 2022-03-25 PROCEDURE — 83880 ASSAY OF NATRIURETIC PEPTIDE: CPT | Performed by: EMERGENCY MEDICINE

## 2022-03-25 PROCEDURE — 93010 ELECTROCARDIOGRAM REPORT: CPT | Mod: ,,, | Performed by: SPECIALIST

## 2022-03-25 PROCEDURE — 63600175 PHARM REV CODE 636 W HCPCS: Performed by: PHYSICIAN ASSISTANT

## 2022-03-25 PROCEDURE — 99999 HC NO LEVEL OF SERVICE - ED ONLY: CPT

## 2022-03-25 PROCEDURE — 83690 ASSAY OF LIPASE: CPT | Performed by: PHYSICIAN ASSISTANT

## 2022-03-25 PROCEDURE — 25000003 PHARM REV CODE 250: Performed by: PHYSICIAN ASSISTANT

## 2022-03-25 PROCEDURE — 85025 COMPLETE CBC W/AUTO DIFF WBC: CPT | Performed by: PHYSICIAN ASSISTANT

## 2022-03-25 PROCEDURE — 93005 ELECTROCARDIOGRAM TRACING: CPT | Performed by: SPECIALIST

## 2022-03-25 PROCEDURE — 93010 EKG 12-LEAD: ICD-10-PCS | Mod: ,,, | Performed by: SPECIALIST

## 2022-03-25 RX ORDER — ONDANSETRON 2 MG/ML
4 INJECTION INTRAMUSCULAR; INTRAVENOUS
Status: COMPLETED | OUTPATIENT
Start: 2022-03-25 | End: 2022-03-25

## 2022-03-25 RX ORDER — BUSPIRONE HYDROCHLORIDE 5 MG/1
TABLET ORAL
COMMUNITY
Start: 2022-03-16 | End: 2022-12-22

## 2022-03-25 RX ORDER — PANTOPRAZOLE SODIUM 40 MG/1
TABLET, DELAYED RELEASE ORAL
COMMUNITY
Start: 2022-02-25

## 2022-03-25 RX ORDER — AMITRIPTYLINE HYDROCHLORIDE 50 MG/1
TABLET, FILM COATED ORAL
COMMUNITY
End: 2022-06-14

## 2022-03-25 RX ORDER — ESCITALOPRAM OXALATE 20 MG/1
TABLET ORAL
COMMUNITY
End: 2022-06-14

## 2022-03-25 RX ORDER — VERAPAMIL HYDROCHLORIDE 40 MG/1
TABLET ORAL
COMMUNITY
End: 2022-06-14

## 2022-03-25 RX ORDER — LIDOCAINE HYDROCHLORIDE 10 MG/ML
3 INJECTION, SOLUTION EPIDURAL; INFILTRATION; INTRACAUDAL; PERINEURAL
COMMUNITY
Start: 2022-02-16 | End: 2022-06-14

## 2022-03-25 RX ORDER — BUTALBITAL, ACETAMINOPHEN AND CAFFEINE 50; 325; 40 MG/1; MG/1; MG/1
TABLET ORAL
COMMUNITY

## 2022-03-25 RX ORDER — FAMOTIDINE 20 MG/1
TABLET, FILM COATED ORAL
COMMUNITY
Start: 2022-03-16

## 2022-03-25 RX ORDER — BACLOFEN 10 MG/1
TABLET ORAL
COMMUNITY
End: 2022-06-14

## 2022-03-25 RX ADMIN — ONDANSETRON 4 MG: 2 INJECTION INTRAMUSCULAR; INTRAVENOUS at 03:03

## 2022-03-25 RX ADMIN — SODIUM CHLORIDE 1000 ML: 0.9 INJECTION, SOLUTION INTRAVENOUS at 03:03

## 2022-03-25 NOTE — FIRST PROVIDER EVALUATION
Emergency Department TeleTriage Encounter Note      CHIEF COMPLAINT    Chief Complaint   Patient presents with    Chest Pain     For 1 month with vomiting       VITAL SIGNS   Initial Vitals [03/25/22 1458]   BP Pulse Resp Temp SpO2   127/88 74 20 97.6 °F (36.4 °C) 99 %      MAP       --            ALLERGIES    Review of patient's allergies indicates:  No Known Allergies    PROVIDER TRIAGE NOTE  This is a teletriage evaluation of a 36 y.o. female presenting to the ED complaining of abdominal pain, vomiting, and diarrhea for one month.  Reports she was seen in [place] and had CT scan.  Has follow up appt with GI in [place] soon, but she could not wait.     Initial orders will be placed and care will be transferred to an alternate provider when patient is roomed for a full evaluation. Any additional orders and the final disposition will be determined by that provider.       ORDERS  Labs Reviewed   CBC W/ AUTO DIFFERENTIAL   COMPREHENSIVE METABOLIC PANEL   LIPASE   URINALYSIS, REFLEX TO URINE CULTURE   POCT URINE PREGNANCY       ED Orders (720h ago, onward)    Start Ordered     Status Ordering Provider    03/25/22 1615 03/25/22 1512  sodium chloride 0.9% bolus 1,000 mL  Once         Ordered LUIS A-JAMILA WONG E.    03/25/22 1515 03/25/22 1512  ondansetron injection 4 mg  ED 1 Time         Ordered LUIS A-JAMILA WONG.    03/25/22 1512 03/25/22 1512  Saline lock IV  Once         Ordered JAMILA BETTS E.    03/25/22 1512 03/25/22 1512  CBC auto differential  STAT         Ordered JAMILA BETTS.    03/25/22 1512 03/25/22 1512  Comprehensive metabolic panel  STAT         Ordered KJ BETTSY E.    03/25/22 1512 03/25/22 1512  Lipase  STAT         Ordered JAMILA BETTS E.    03/25/22 1512 03/25/22 1512  Urinalysis, Reflex to Urine Culture Urine, Clean Catch  STAT         Ordered JAMILA BETTS E.    03/25/22 1512 03/25/22 1512  POCT urine pregnancy  Once          JAMILA Blackwell            Virtual Visit Note: The provider triage portion of this emergency department evaluation and documentation was performed via [employer], a HIPAA-compliant telemedicine application, in concert with a tele-presenter in the room. A face to face patient evaluation with one of my colleagues will occur once the patient is placed in an emergency department room.      DISCLAIMER: This note was prepared with [employer] voice recognition transcription software. Garbled syntax, mangled pronouns, and other bizarre constructions may be attributed to that software system.

## 2022-03-26 NOTE — ED PROVIDER NOTES
Assigned myself to see the patient.  Additional lab test had been ordered.I did not see the patient personally prior to being informed by the nurse that the patient had left the ED prior to my personal evaluation.      Stefany Wood MD  03/25/22 8619

## 2022-05-24 ENCOUNTER — PATIENT MESSAGE (OUTPATIENT)
Dept: NEUROLOGY | Facility: CLINIC | Age: 37
End: 2022-05-24
Payer: MEDICARE

## 2022-06-06 ENCOUNTER — PATIENT MESSAGE (OUTPATIENT)
Dept: NEUROLOGY | Facility: CLINIC | Age: 37
End: 2022-06-06
Payer: MEDICARE

## 2022-06-09 ENCOUNTER — PATIENT MESSAGE (OUTPATIENT)
Dept: NEUROLOGY | Facility: CLINIC | Age: 37
End: 2022-06-09
Payer: MEDICARE

## 2022-06-14 ENCOUNTER — PROCEDURE VISIT (OUTPATIENT)
Dept: NEUROLOGY | Facility: CLINIC | Age: 37
End: 2022-06-14
Payer: MEDICARE

## 2022-06-14 VITALS
SYSTOLIC BLOOD PRESSURE: 103 MMHG | HEART RATE: 68 BPM | WEIGHT: 180 LBS | RESPIRATION RATE: 17 BRPM | DIASTOLIC BLOOD PRESSURE: 72 MMHG | BODY MASS INDEX: 31.89 KG/M2 | HEIGHT: 63 IN

## 2022-06-14 DIAGNOSIS — G43.719 INTRACTABLE CHRONIC MIGRAINE WITHOUT AURA AND WITHOUT STATUS MIGRAINOSUS: Primary | ICD-10-CM

## 2022-06-14 PROCEDURE — 64615 CHEMODENERV MUSC MIGRAINE: CPT | Mod: PBBFAC,PO | Performed by: PSYCHIATRY & NEUROLOGY

## 2022-06-14 PROCEDURE — 64615 CHEMODENERV MUSC MIGRAINE: CPT | Mod: S$PBB,,, | Performed by: PSYCHIATRY & NEUROLOGY

## 2022-06-14 PROCEDURE — 64615 PR CHEMODENERVATION OF MUSCLE FOR CHRONIC MIGRAINE: ICD-10-PCS | Mod: S$PBB,,, | Performed by: PSYCHIATRY & NEUROLOGY

## 2022-06-14 RX ADMIN — ONABOTULINUMTOXINA 200 UNITS: 100 INJECTION, POWDER, LYOPHILIZED, FOR SOLUTION INTRADERMAL; INTRAMUSCULAR at 04:06

## 2022-06-14 NOTE — PROCEDURES
Procedures     BOTOX PROCEDURE    PROCEDURE PERFORMED: Botulinum toxin injection (96761)    CLINICAL INDICATION: G43.719    Cycle #6    Amy continues to get significant benefit from Botox.  She continues to have significant benefit from Botox for chronic migraine prevention.     A time out was conducted just before the start of the procedure to verify the correct patient and procedure, procedure location, and all relevant critical information.   Signed consent obtained prior to procedure     Conventional methods of treatment but the patient has been unresponsive and refractory.The patient meets criteria for chronic headaches according to the ICHD-III, the patient has more than 15 headaches a month at least 8 of them meet migraine criteria, which last for more than 4 hours a day.     Last Botox injections were on 3/4/22  and  there has been over 50%  improvement in the patient's symptoms. Frequency of treatment is every 12 weeks unless no response to the treatments, at which time we will discontinue the injections.     DESCRIPTION OF PROCEDURE: After obtaining informed consent and under  aseptic technique, a total of 155 units of botulinum toxin type A were   injected in the following muscles: Procerus 5 units,  5 units  bilaterally, frontalis 20 units, temporalis 20 units bilaterally,  occipitalis 15 units, upper cervical paraspinals 10 units bilaterally and trapezius 15 units bilaterally. The patient was given a total of 155 units in 31 sites.    The patient tolerated the procedure well. There were no complications. The patient was given a prescription for repeat treatment in 12 weeks.     Unavoidable waste 45 units    Talisha Johansen MD   Board Certified Neurologist   [facility] Certified Headache Medicine

## 2022-08-17 ENCOUNTER — PATIENT MESSAGE (OUTPATIENT)
Dept: NEUROLOGY | Facility: CLINIC | Age: 37
End: 2022-08-17
Payer: MEDICARE

## 2022-08-17 ENCOUNTER — TELEPHONE (OUTPATIENT)
Dept: NEUROLOGY | Facility: CLINIC | Age: 37
End: 2022-08-17
Payer: MEDICARE

## 2022-08-17 DIAGNOSIS — G43.719 INTRACTABLE CHRONIC MIGRAINE WITHOUT AURA AND WITHOUT STATUS MIGRAINOSUS: Primary | ICD-10-CM

## 2022-09-16 ENCOUNTER — PATIENT MESSAGE (OUTPATIENT)
Dept: NEUROLOGY | Facility: CLINIC | Age: 37
End: 2022-09-16
Payer: MEDICARE

## 2022-09-21 ENCOUNTER — PROCEDURE VISIT (OUTPATIENT)
Dept: NEUROLOGY | Facility: CLINIC | Age: 37
End: 2022-09-21
Payer: MEDICARE

## 2022-09-21 VITALS
SYSTOLIC BLOOD PRESSURE: 137 MMHG | BODY MASS INDEX: 31.89 KG/M2 | HEIGHT: 63 IN | DIASTOLIC BLOOD PRESSURE: 80 MMHG | RESPIRATION RATE: 17 BRPM | WEIGHT: 180 LBS | HEART RATE: 77 BPM

## 2022-09-21 DIAGNOSIS — G43.719 INTRACTABLE CHRONIC MIGRAINE WITHOUT AURA AND WITHOUT STATUS MIGRAINOSUS: Primary | ICD-10-CM

## 2022-09-21 PROCEDURE — 64615 CHEMODENERV MUSC MIGRAINE: CPT | Mod: S$PBB,,, | Performed by: PSYCHIATRY & NEUROLOGY

## 2022-09-21 PROCEDURE — 64615 PR CHEMODENERVATION OF MUSCLE FOR CHRONIC MIGRAINE: ICD-10-PCS | Mod: S$PBB,,, | Performed by: PSYCHIATRY & NEUROLOGY

## 2022-09-21 PROCEDURE — 64615 CHEMODENERV MUSC MIGRAINE: CPT | Mod: PBBFAC,PO | Performed by: PSYCHIATRY & NEUROLOGY

## 2022-09-21 RX ORDER — ONDANSETRON 4 MG/1
4 TABLET, ORALLY DISINTEGRATING ORAL EVERY 8 HOURS PRN
Qty: 15 TABLET | Refills: 6 | Status: SHIPPED | OUTPATIENT
Start: 2022-09-21

## 2022-09-21 RX ORDER — PROCHLORPERAZINE MALEATE 10 MG
10 TABLET ORAL 3 TIMES DAILY PRN
Qty: 14 TABLET | Refills: 3 | Status: SHIPPED | OUTPATIENT
Start: 2022-09-21 | End: 2022-12-22 | Stop reason: SDUPTHER

## 2022-09-21 RX ADMIN — ONABOTULINUMTOXINA 200 UNITS: 100 INJECTION, POWDER, LYOPHILIZED, FOR SOLUTION INTRADERMAL; INTRAMUSCULAR at 08:09

## 2022-09-21 NOTE — PROCEDURES
Procedures   BOTOX PROCEDURE    PROCEDURE PERFORMED: Botulinum toxin injection ([id number])    CLINICAL INDICATION: G43.719    Cycle #7    Amy continues to get significant benefit from Botox.  She continues to have significant benefit from Botox for chronic migraine prevention.     A time out was conducted just before the start of the procedure to verify the correct patient and procedure, procedure location, and all relevant critical information.   Signed consent obtained prior to procedure     Conventional methods of treatment but the patient has been unresponsive and refractory.The patient meets criteria for chronic headaches according to the ICHD-III, the patient has more than 15 headaches a month at least 8 of them meet migraine criteria, which last for more than 4 hours a day.     Last Botox injections were on 6/1/22  and  there has been over 50%  improvement in the patient's symptoms. Frequency of treatment is every 12 weeks unless no response to the treatments, at which time we will discontinue the injections.     DESCRIPTION OF PROCEDURE: After obtaining informed consent and under  aseptic technique, a total of 155 units of botulinum toxin type A were   injected in the following muscles: Procerus 5 units,  5 units  bilaterally, frontalis 20 units, temporalis 20 units bilaterally,  occipitalis 15 units, upper cervical paraspinals 10 units bilaterally and trapezius 15 units bilaterally. The patient was given a total of 155 units in 31 sites.    The patient tolerated the procedure well. There were no complications. The patient was given a prescription for repeat treatment in 12 weeks.     Unavoidable waste 45 units    RTC in 12 weeks for Botox will transfer care to one of my colleagues as I am departing from [facility].     Talisha Johansen MD   Board Certified Neurologist   [facility] Certified Headache Medicine

## 2022-10-12 DIAGNOSIS — G43.719 INTRACTABLE CHRONIC MIGRAINE WITHOUT AURA AND WITHOUT STATUS MIGRAINOSUS: Primary | ICD-10-CM

## 2022-10-13 RX ORDER — CLOPIDOGREL BISULFATE 75 MG/1
75 TABLET ORAL DAILY
Qty: 90 TABLET | Refills: 1 | Status: SHIPPED | OUTPATIENT
Start: 2022-10-13 | End: 2022-12-22 | Stop reason: SDUPTHER

## 2022-12-14 ENCOUNTER — TELEPHONE (OUTPATIENT)
Dept: NEUROLOGY | Facility: CLINIC | Age: 37
End: 2022-12-14
Payer: MEDICARE

## 2022-12-20 ENCOUNTER — TELEPHONE (OUTPATIENT)
Dept: NEUROLOGY | Facility: CLINIC | Age: 37
End: 2022-12-20
Payer: MEDICARE

## 2022-12-20 NOTE — TELEPHONE ENCOUNTER
----- Message from Genet Cedillo sent at 12/20/2022 12:08 PM CST -----  Contact: patient  Type:  Patient Returning Call    Who Called: patient     Who Left Message for Patient:Cici     Does the patient know what this is regarding?: no     Would the patient rather a call back or a response via [facility]? Call     Best Call Back Number:[phone] (home)      Additional Information:

## 2022-12-21 ENCOUNTER — TELEPHONE (OUTPATIENT)
Dept: NEUROLOGY | Facility: CLINIC | Age: 37
End: 2022-12-21
Payer: MEDICARE

## 2022-12-21 DIAGNOSIS — G43.719 INTRACTABLE CHRONIC MIGRAINE WITHOUT AURA AND WITHOUT STATUS MIGRAINOSUS: Primary | ICD-10-CM

## 2022-12-21 RX ORDER — PROPRANOLOL HYDROCHLORIDE 80 MG/1
80 CAPSULE, EXTENDED RELEASE ORAL DAILY
Qty: 30 CAPSULE | Refills: 11 | Status: SHIPPED | OUTPATIENT
Start: 2022-12-21 | End: 2023-12-21

## 2022-12-21 NOTE — TELEPHONE ENCOUNTER
Spoke with pt and scheduled botox appointment and sent refill request to provider.Verbalized understanding.

## 2022-12-21 NOTE — TELEPHONE ENCOUNTER
----- Message from Louisetiffanie Scottjasmina sent at 12/21/2022 10:29 AM CST -----  Contact: pt at [phone]  Type: Needs Medical Advice  Who Called:  pt  Pharmacy name and phone #:    [facility] - [place], [place] - [address]  [address] [address]  Phone: [phone] Fax: [phone]  Best Call Back Number: [phone]  Additional Information: pt is calling the office to have her BOTOX appt rescheduled as well as to get her Rx of propranolol 80 MG. This medication is missing from her chart but the pt has been on it for years. Please call back to advise.

## 2022-12-22 ENCOUNTER — PROCEDURE VISIT (OUTPATIENT)
Dept: NEUROLOGY | Facility: CLINIC | Age: 37
End: 2022-12-22
Payer: MEDICARE

## 2022-12-22 VITALS
BODY MASS INDEX: 31.75 KG/M2 | DIASTOLIC BLOOD PRESSURE: 87 MMHG | SYSTOLIC BLOOD PRESSURE: 128 MMHG | HEART RATE: 108 BPM | WEIGHT: 179.25 LBS | RESPIRATION RATE: 20 BRPM

## 2022-12-22 DIAGNOSIS — R11.0 NAUSEA: ICD-10-CM

## 2022-12-22 DIAGNOSIS — G43.719 INTRACTABLE CHRONIC MIGRAINE WITHOUT AURA AND WITHOUT STATUS MIGRAINOSUS: Primary | ICD-10-CM

## 2022-12-22 PROCEDURE — 64615 CHEMODENERV MUSC MIGRAINE: CPT | Mod: PBBFAC,PO | Performed by: NURSE PRACTITIONER

## 2022-12-22 PROCEDURE — 64615 CHEMODENERV MUSC MIGRAINE: CPT | Mod: S$PBB,,, | Performed by: NURSE PRACTITIONER

## 2022-12-22 PROCEDURE — 64615 PR CHEMODENERVATION OF MUSCLE FOR CHRONIC MIGRAINE: ICD-10-PCS | Mod: S$PBB,,, | Performed by: NURSE PRACTITIONER

## 2022-12-22 RX ORDER — CLOPIDOGREL BISULFATE 75 MG/1
75 TABLET ORAL DAILY
Qty: 90 TABLET | Refills: 3 | Status: SHIPPED | OUTPATIENT
Start: 2022-12-22

## 2022-12-22 RX ORDER — PROCHLORPERAZINE MALEATE 10 MG
10 TABLET ORAL 3 TIMES DAILY PRN
Qty: 14 TABLET | Refills: 3 | Status: SHIPPED | OUTPATIENT
Start: 2022-12-22

## 2022-12-22 RX ORDER — GABAPENTIN 300 MG/1
600 CAPSULE ORAL 2 TIMES DAILY
Qty: 120 CAPSULE | Refills: 11 | Status: SHIPPED | OUTPATIENT
Start: 2022-12-22

## 2022-12-22 RX ADMIN — ONABOTULINUMTOXINA 200 UNITS: 100 INJECTION, POWDER, LYOPHILIZED, FOR SOLUTION INTRADERMAL; INTRAMUSCULAR at 01:12

## 2022-12-22 NOTE — PROCEDURES
Procedures   BOTOX PROCEDURE    PROCEDURE PERFORMED: Botulinum toxin injection ([id number])    CLINICAL INDICATION: G43.719    Cycle #8    Amy continues to get significant benefit from Botox.  She continues to have significant benefit from Botox for chronic migraine prevention.     A time out was conducted just before the start of the procedure to verify the correct patient and procedure, procedure location, and all relevant critical information.   Signed consent obtained prior to procedure     Conventional methods of treatment but the patient has been unresponsive and refractory.The patient meets criteria for chronic headaches according to the ICHD-III, the patient has more than 15 headaches a month at least 8 of them meet migraine criteria, which last for more than 4 hours a day.     Last Botox injections were 12 weeks ago and  there has been over 50%  improvement in the patient's symptoms. Frequency of treatment is every 12 weeks unless no response to the treatments, at which time we will discontinue the injections.     DESCRIPTION OF PROCEDURE: After obtaining informed consent and under  aseptic technique, a total of 155 units of botulinum toxin type A were   injected in the following muscles: Procerus 5 units,  5 units  bilaterally, frontalis 20 units, temporalis 20 units bilaterally,  occipitalis 15 units, upper cervical paraspinals 10 units bilaterally and trapezius 15 units bilaterally. The patient was given a total of 155 units in 31 sites.    The patient tolerated the procedure well. There were no complications. The patient was given a prescription for repeat treatment in 12 weeks.     Unavoidable waste 45 units    RTC in 12 weeks for Botox     TAMI Preciado

## 2023-03-27 ENCOUNTER — PROCEDURE VISIT (OUTPATIENT)
Dept: NEUROLOGY | Facility: CLINIC | Age: 38
End: 2023-03-27
Payer: MEDICARE

## 2023-03-27 VITALS
DIASTOLIC BLOOD PRESSURE: 83 MMHG | RESPIRATION RATE: 17 BRPM | BODY MASS INDEX: 30.6 KG/M2 | SYSTOLIC BLOOD PRESSURE: 111 MMHG | WEIGHT: 179.25 LBS | HEIGHT: 64 IN | TEMPERATURE: 98 F | HEART RATE: 80 BPM

## 2023-03-27 DIAGNOSIS — G43.719 INTRACTABLE CHRONIC MIGRAINE WITHOUT AURA AND WITHOUT STATUS MIGRAINOSUS: Primary | ICD-10-CM

## 2023-03-27 PROCEDURE — 64615 CHEMODENERV MUSC MIGRAINE: CPT | Mod: S$PBB,,, | Performed by: NURSE PRACTITIONER

## 2023-03-27 PROCEDURE — 64615 CHEMODENERV MUSC MIGRAINE: CPT | Mod: PBBFAC,PO | Performed by: NURSE PRACTITIONER

## 2023-03-27 PROCEDURE — 64615 PR CHEMODENERVATION OF MUSCLE FOR CHRONIC MIGRAINE: ICD-10-PCS | Mod: S$PBB,,, | Performed by: NURSE PRACTITIONER

## 2023-03-27 RX ADMIN — ONABOTULINUMTOXINA 200 UNITS: 100 INJECTION, POWDER, LYOPHILIZED, FOR SOLUTION INTRADERMAL; INTRAMUSCULAR at 02:03

## 2023-03-27 NOTE — PROCEDURES
Problem: Mobility Impaired (Adult and Pediatric)  Goal: *Acute Goals and Plan of Care (Insert Text)  Description: Physical Therapy Goals  Initiated 4/16/2021 and to be accomplished within 7 day(s)  1. Patient will move from supine to sit and sit to supine , scoot up and down, and roll side to side in bed with modified independence. 2.  Patient will transfer from bed to chair and chair to bed with modified independence using the least restrictive device. 3.  Patient will perform sit to stand with modified independence. 4.  Patient will ambulate with supervision/set-up for 150 feet with the least restrictive device. 5.  Patient will ascend/descend 6 stairs with unilateral handrail(s) with minimal assistance/contact guard assist.     PLOF: Pt extremely hard of hearing, reporting [address] home in 1 story house, has RW/cane mobility, son and his family. Outcome: Progressing Towards Goal   PHYSICAL THERAPY TREATMENT    Patient: Florence Huber (68 y.o. female)  Date: 4/20/2021  Diagnosis: Acute on chronic systolic (congestive) heart failure (HCC) [I50.23]  HTN (hypertension) [I10]  HLD (hyperlipidemia) [E78.5] Acute on chronic systolic (congestive) heart failure (HCC)  Precautions: Fall  ASSESSMENT:  Yelling out requesting bedpan. Educated on use of bedside commode. Supervision for supine to sit. Supervision for sit to stand and transfer to bedside commode. Seated on bedside commode with supervision for balance. Completed clean-up in standing post toileting with supervision for dynamic standing balance. Returned to seated in bed with supervision. Poor safety awareness and impulsive in mobility. Verbal cues to slow down and sequence mobility. SOB with clean-up post toileting on 2L O2. Hearing deficits is a barrier to education and demonstration of compliance with PT cues. Educated on need for RN assistance with mobility and use of call bell; verbalized understanding. Call bell in reach.      Progression toward goals:   [] Procedures   BOTOX PROCEDURE    PROCEDURE PERFORMED: Botulinum toxin injection (07430)    CLINICAL INDICATION: G43.719    Cycle #9    Amy continues to get significant benefit from Botox.  She continues to have significant benefit from Botox for chronic migraine prevention.     A time out was conducted just before the start of the procedure to verify the correct patient and procedure, procedure location, and all relevant critical information.   Signed consent obtained prior to procedure     Conventional methods of treatment but the patient has been unresponsive and refractory.The patient meets criteria for chronic headaches according to the ICHD-III, the patient has more than 15 headaches a month at least 8 of them meet migraine criteria, which last for more than 4 hours a day.     Last Botox injections were 12 weeks ago and  there has been over 50%  improvement in the patient's symptoms. Frequency of treatment is every 12 weeks unless no response to the treatments, at which time we will discontinue the injections.     DESCRIPTION OF PROCEDURE: After obtaining informed consent and under  aseptic technique, a total of 155 units of botulinum toxin type A were   injected in the following muscles: Procerus 5 units,  5 units  bilaterally, frontalis 20 units, temporalis 20 units bilaterally,  occipitalis 15 units, upper cervical paraspinals 10 units bilaterally and trapezius 15 units bilaterally. The patient was given a total of 155 units in 31 sites.    The patient tolerated the procedure well. There were no complications. The patient was given a prescription for repeat treatment in 12 weeks.     Unavoidable waste 45 units    RTC in 12 weeks for Botox     TAMI Preciado            Improving appropriately and progressing toward goals  [x]      Improving slowly and progressing toward goals  []      Not making progress toward goals and plan of care will be adjusted     PLAN:  Patient continues to benefit from skilled intervention to address the above impairments. Continue treatment per established plan of care. Discharge Recommendations:  Home Health with 24/7 supervision  Further Equipment Recommendations for Discharge:  rolling walker     SUBJECTIVE:   Patient stated You can take a break. I'm going to be a while.     OBJECTIVE DATA SUMMARY:   Critical Behavior:  Neurologic State: Alert  Orientation Level: Oriented to person  Cognition: Poor safety awareness; Impulsive     Psychosocial  Patient Behaviors: Demanding  Functional Mobility:  Bed Mobility:  Supine to Sit: Supervision  Sit to Supine: Supervision  Transfers:  Sit to Stand: Supervision  Stand to Sit: Supervision  Bed to Chair: Supervision  Balance:   Sitting: Impaired  Sitting - Static: Good (unsupported)  Sitting - Dynamic: Good (unsupported)  Standing: Impaired  Standing - Static: Good  Standing - Dynamic : Fair  Neuro Re-Education:  Seated balance 5 minutes  Standing balance 5 minutes  Therapeutic Exercises:   Sit to stand x3  Bed to bedside commode transfer x2    Pain:  Pain level pre-treatment: 0/10  Pain level post-treatment: 0/10     Activity Tolerance:   Fair    After treatment:   [] Patient left in no apparent distress sitting up in chair  [x] Patient left in no apparent distress in bed  [x] Call bell left within reach  [x] Nursing notified  [] Caregiver present  [] Bed alarm activated  [] SCDs applied      COMMUNICATION/EDUCATION:   [x]         Role of physical therapy in the acute care setting. [x]         Fall prevention education was provided and the patient/caregiver indicated understanding. [x]         Patient/family have participated as able in working toward goals and plan of care.   [x]         Patient/family agree to work toward stated goals and plan of care. []         Patient understands intent and goals of therapy, but is neutral about his/her participation. []         Patient is unable to participate in stated goals/plan of care: ongoing with therapy staff.       Bryon Jean, PT   Time Calculation: 23 mins

## 2023-05-06 NOTE — ED NOTES
2105  Attempted to call pt and spouse.  Pt called back a few min later and I apologized for the wait and told her that Dr Wood had finished dealing with a critical transfer that had her tied up for a couple of hours.  I told her that GRANT Wood would like to see her if she was still close by.  Pt refused and stated she would never return.  
Bed: 24  Expected date:   Expected time:   Means of arrival:   Comments:  Triage RS  
Patient explained she is leaving. She requested that this RN remove the IV. This RN complied and explained all the risks involved. Charge RN and MD will be notified.   
clear

## 2023-06-19 ENCOUNTER — PROCEDURE VISIT (OUTPATIENT)
Dept: NEUROLOGY | Facility: CLINIC | Age: 38
End: 2023-06-19
Payer: MEDICARE

## 2023-06-19 VITALS
HEIGHT: 64 IN | BODY MASS INDEX: 32.94 KG/M2 | SYSTOLIC BLOOD PRESSURE: 126 MMHG | WEIGHT: 192.94 LBS | DIASTOLIC BLOOD PRESSURE: 83 MMHG | HEART RATE: 58 BPM

## 2023-06-19 DIAGNOSIS — G43.719 INTRACTABLE CHRONIC MIGRAINE WITHOUT AURA AND WITHOUT STATUS MIGRAINOSUS: Primary | ICD-10-CM

## 2023-06-19 PROCEDURE — 64615 CHEMODENERV MUSC MIGRAINE: CPT | Mod: S$PBB,,, | Performed by: NURSE PRACTITIONER

## 2023-06-19 PROCEDURE — 64615 PR CHEMODENERVATION OF MUSCLE FOR CHRONIC MIGRAINE: ICD-10-PCS | Mod: S$PBB,,, | Performed by: NURSE PRACTITIONER

## 2023-06-19 PROCEDURE — 64615 CHEMODENERV MUSC MIGRAINE: CPT | Mod: PBBFAC,PO | Performed by: NURSE PRACTITIONER

## 2023-06-19 RX ADMIN — ONABOTULINUMTOXINA 200 UNITS: 100 INJECTION, POWDER, LYOPHILIZED, FOR SOLUTION INTRADERMAL; INTRAMUSCULAR at 02:06

## 2023-06-19 NOTE — PROCEDURES
Procedures   BOTOX PROCEDURE    PROCEDURE PERFORMED: Botulinum toxin injection ([id number])    CLINICAL INDICATION: G43.719    Cycle #10    Amy continues to get significant benefit from Botox.  She continues to have significant benefit from Botox for chronic migraine prevention.     A time out was conducted just before the start of the procedure to verify the correct patient and procedure, procedure location, and all relevant critical information.   Signed consent obtained prior to procedure     Conventional methods of treatment but the patient has been unresponsive and refractory.The patient meets criteria for chronic headaches according to the ICHD-III, the patient has more than 15 headaches a month at least 8 of them meet migraine criteria, which last for more than 4 hours a day.     Last Botox injections were 12 weeks ago and  there has been over 50%  improvement in the patient's symptoms. Frequency of treatment is every 12 weeks unless no response to the treatments, at which time we will discontinue the injections.     DESCRIPTION OF PROCEDURE: After obtaining informed consent and under  aseptic technique, a total of 155 units of botulinum toxin type A were   injected in the following muscles: Procerus 5 units,  5 units  bilaterally, frontalis 20 units, temporalis 20 units bilaterally,  occipitalis 15 units, upper cervical paraspinals 10 units bilaterally and trapezius 15 units bilaterally. The patient was given a total of 155 units in 31 sites.    The patient tolerated the procedure well. There were no complications. The patient was given a prescription for repeat treatment in 12 weeks.     Unavoidable waste 45 units    RTC in 12 weeks for Botox     TAMI Preciado

## 2023-09-12 ENCOUNTER — PROCEDURE VISIT (OUTPATIENT)
Dept: NEUROLOGY | Facility: CLINIC | Age: 38
End: 2023-09-12
Payer: MEDICARE

## 2023-09-12 VITALS
TEMPERATURE: 98 F | RESPIRATION RATE: 17 BRPM | SYSTOLIC BLOOD PRESSURE: 122 MMHG | WEIGHT: 192.88 LBS | HEIGHT: 64 IN | HEART RATE: 72 BPM | DIASTOLIC BLOOD PRESSURE: 88 MMHG | BODY MASS INDEX: 32.93 KG/M2

## 2023-09-12 DIAGNOSIS — G43.719 INTRACTABLE CHRONIC MIGRAINE WITHOUT AURA AND WITHOUT STATUS MIGRAINOSUS: Primary | ICD-10-CM

## 2023-09-12 PROCEDURE — 64615 CHEMODENERV MUSC MIGRAINE: CPT | Mod: PBBFAC,PO | Performed by: NURSE PRACTITIONER

## 2023-09-12 PROCEDURE — 64615 PR CHEMODENERVATION OF MUSCLE FOR CHRONIC MIGRAINE: ICD-10-PCS | Mod: S$PBB,,, | Performed by: NURSE PRACTITIONER

## 2023-09-12 PROCEDURE — 64615 CHEMODENERV MUSC MIGRAINE: CPT | Mod: S$PBB,,, | Performed by: NURSE PRACTITIONER

## 2023-09-12 PROCEDURE — 99999PBSHW PR PBB SHADOW TECHNICAL ONLY FILED TO HB: Mod: JW,PBBFAC,,

## 2023-09-12 PROCEDURE — 99999PBSHW PR PBB SHADOW TECHNICAL ONLY FILED TO HB: ICD-10-PCS | Mod: JW,PBBFAC,,

## 2023-09-12 RX ADMIN — ONABOTULINUMTOXINA 200 UNITS: 100 INJECTION, POWDER, LYOPHILIZED, FOR SOLUTION INTRADERMAL; INTRAMUSCULAR at 02:09

## 2023-09-12 NOTE — PROCEDURES
Procedures   BOTOX PROCEDURE    PROCEDURE PERFORMED: Botulinum toxin injection ([id number])    CLINICAL INDICATION: G43.719    Cycle #11    Amy continues to get significant benefit from Botox.  She continues to have significant benefit from Botox for chronic migraine prevention.     A time out was conducted just before the start of the procedure to verify the correct patient and procedure, procedure location, and all relevant critical information.   Signed consent obtained prior to procedure     Conventional methods of treatment but the patient has been unresponsive and refractory.The patient meets criteria for chronic headaches according to the ICHD-III, the patient has more than 15 headaches a month at least 8 of them meet migraine criteria, which last for more than 4 hours a day.     Last Botox injections were 12 weeks ago and  there has been over 50%  improvement in the patient's symptoms. Frequency of treatment is every 12 weeks unless no response to the treatments, at which time we will discontinue the injections.     DESCRIPTION OF PROCEDURE: After obtaining informed consent and under  aseptic technique, a total of 155 units of botulinum toxin type A were   injected in the following muscles: Procerus 5 units,  5 units  bilaterally, frontalis 20 units, temporalis 20 units bilaterally,  occipitalis 15 units, upper cervical paraspinals 10 units bilaterally and trapezius 15 units bilaterally. The patient was given a total of 155 units in 31 sites.    The patient tolerated the procedure well. There were no complications. The patient was given a prescription for repeat treatment in 12 weeks.     Unavoidable waste 45 units    RTC in 12 weeks for Botox     TAMI Preciado

## 2023-12-04 ENCOUNTER — PATIENT MESSAGE (OUTPATIENT)
Dept: NEUROLOGY | Facility: CLINIC | Age: 38
End: 2023-12-04
Payer: MEDICARE

## 2023-12-13 ENCOUNTER — PROCEDURE VISIT (OUTPATIENT)
Dept: NEUROLOGY | Facility: CLINIC | Age: 38
End: 2023-12-13
Payer: MEDICARE

## 2023-12-13 VITALS
HEART RATE: 64 BPM | WEIGHT: 192.88 LBS | DIASTOLIC BLOOD PRESSURE: 72 MMHG | BODY MASS INDEX: 32.93 KG/M2 | HEIGHT: 64 IN | SYSTOLIC BLOOD PRESSURE: 120 MMHG | TEMPERATURE: 98 F | RESPIRATION RATE: 17 BRPM

## 2023-12-13 DIAGNOSIS — G43.719 INTRACTABLE CHRONIC MIGRAINE WITHOUT AURA AND WITHOUT STATUS MIGRAINOSUS: Primary | ICD-10-CM

## 2023-12-13 PROCEDURE — 99999PBSHW PR PBB SHADOW TECHNICAL ONLY FILED TO HB: ICD-10-PCS | Mod: PBBFAC,,,

## 2023-12-13 PROCEDURE — 64615 CHEMODENERV MUSC MIGRAINE: CPT | Mod: S$PBB,,, | Performed by: NURSE PRACTITIONER

## 2023-12-13 PROCEDURE — 96372 THER/PROPH/DIAG INJ SC/IM: CPT | Mod: PBBFAC,59,PO

## 2023-12-13 PROCEDURE — 64615 CHEMODENERV MUSC MIGRAINE: CPT | Mod: PBBFAC,PO | Performed by: NURSE PRACTITIONER

## 2023-12-13 PROCEDURE — 64615 PR CHEMODENERVATION OF MUSCLE FOR CHRONIC MIGRAINE: ICD-10-PCS | Mod: S$PBB,,, | Performed by: NURSE PRACTITIONER

## 2023-12-13 PROCEDURE — 99999PBSHW PR PBB SHADOW TECHNICAL ONLY FILED TO HB: Mod: PBBFAC,,,

## 2023-12-13 RX ORDER — ONDANSETRON 2 MG/ML
4 INJECTION INTRAMUSCULAR; INTRAVENOUS
Status: COMPLETED | OUTPATIENT
Start: 2023-12-13 | End: 2023-12-13

## 2023-12-13 RX ORDER — KETOROLAC TROMETHAMINE 30 MG/ML
30 INJECTION, SOLUTION INTRAMUSCULAR; INTRAVENOUS
Status: COMPLETED | OUTPATIENT
Start: 2023-12-13 | End: 2023-12-13

## 2023-12-13 RX ADMIN — ONABOTULINUMTOXINA 200 UNITS: 100 INJECTION, POWDER, LYOPHILIZED, FOR SOLUTION INTRADERMAL; INTRAMUSCULAR at 02:12

## 2023-12-13 RX ADMIN — KETOROLAC TROMETHAMINE 30 MG: 30 INJECTION, SOLUTION INTRAMUSCULAR; INTRAVENOUS at 02:12

## 2023-12-13 RX ADMIN — ONDANSETRON 4 MG: 2 INJECTION INTRAMUSCULAR; INTRAVENOUS at 02:12

## 2023-12-13 NOTE — PROCEDURES
Procedures   BOTOX PROCEDURE    PROCEDURE PERFORMED: Botulinum toxin injection ([id number])    CLINICAL INDICATION: G43.719    Cycle #12    Amy continues to get significant benefit from Botox.  She continues to have significant benefit from Botox for chronic migraine prevention.     A time out was conducted just before the start of the procedure to verify the correct patient and procedure, procedure location, and all relevant critical information.   Signed consent obtained prior to procedure     Conventional methods of treatment but the patient has been unresponsive and refractory.The patient meets criteria for chronic headaches according to the ICHD-III, the patient has more than 15 headaches a month at least 8 of them meet migraine criteria, which last for more than 4 hours a day.     Last Botox injections were 12 weeks ago and  there has been over 50%  improvement in the patient's symptoms. Frequency of treatment is every 12 weeks unless no response to the treatments, at which time we will discontinue the injections.     DESCRIPTION OF PROCEDURE: After obtaining informed consent and under  aseptic technique, a total of 155 units of botulinum toxin type A were   injected in the following muscles: Procerus 5 units,  5 units  bilaterally, frontalis 20 units, temporalis 20 units bilaterally,  occipitalis 15 units, upper cervical paraspinals 10 units bilaterally and trapezius 15 units bilaterally. The patient was given a total of 155 units in 31 sites.    The patient tolerated the procedure well. There were no complications. The patient was given a prescription for repeat treatment in 12 weeks.     Unavoidable waste 45 units    RTC in 12 weeks for Botox     TAMI Preciado

## 2024-03-06 ENCOUNTER — PROCEDURE VISIT (OUTPATIENT)
Dept: NEUROLOGY | Facility: CLINIC | Age: 39
End: 2024-03-06
Payer: MEDICARE

## 2024-03-06 VITALS
HEIGHT: 64 IN | TEMPERATURE: 98 F | RESPIRATION RATE: 17 BRPM | WEIGHT: 192.88 LBS | SYSTOLIC BLOOD PRESSURE: 120 MMHG | DIASTOLIC BLOOD PRESSURE: 85 MMHG | BODY MASS INDEX: 32.93 KG/M2 | HEART RATE: 83 BPM

## 2024-03-06 DIAGNOSIS — G43.719 INTRACTABLE CHRONIC MIGRAINE WITHOUT AURA AND WITHOUT STATUS MIGRAINOSUS: Primary | ICD-10-CM

## 2024-03-06 PROCEDURE — 64615 CHEMODENERV MUSC MIGRAINE: CPT | Mod: PBBFAC,PO | Performed by: NURSE PRACTITIONER

## 2024-03-06 PROCEDURE — 64615 CHEMODENERV MUSC MIGRAINE: CPT | Mod: S$PBB,,, | Performed by: NURSE PRACTITIONER

## 2024-03-06 PROCEDURE — 99999PBSHW PR PBB SHADOW TECHNICAL ONLY FILED TO HB: Mod: JW,PBBFAC,,

## 2024-03-06 RX ADMIN — ONABOTULINUMTOXINA 200 UNITS: 100 INJECTION, POWDER, LYOPHILIZED, FOR SOLUTION INTRADERMAL; INTRAMUSCULAR at 02:03

## 2024-03-06 NOTE — PROCEDURES
Procedures   BOTOX PROCEDURE    PROCEDURE PERFORMED: Botulinum toxin injection (85806)    CLINICAL INDICATION: G43.719    Cycle #13    Amy continues to get significant benefit from Botox.  She continues to have significant benefit from Botox for chronic migraine prevention.     A time out was conducted just before the start of the procedure to verify the correct patient and procedure, procedure location, and all relevant critical information.   Signed consent obtained prior to procedure     Conventional methods of treatment but the patient has been unresponsive and refractory.The patient meets criteria for chronic headaches according to the ICHD-III, the patient has more than 15 headaches a month at least 8 of them meet migraine criteria, which last for more than 4 hours a day.     Last Botox injections were 12 weeks ago and  there has been over 50%  improvement in the patient's symptoms. Frequency of treatment is every 12 weeks unless no response to the treatments, at which time we will discontinue the injections.     DESCRIPTION OF PROCEDURE: After obtaining informed consent and under  aseptic technique, a total of 155 units of botulinum toxin type A were   injected in the following muscles: Procerus 5 units,  5 units  bilaterally, frontalis 20 units, temporalis 20 units bilaterally,  occipitalis 15 units, upper cervical paraspinals 10 units bilaterally and trapezius 15 units bilaterally. The patient was given a total of 155 units in 31 sites.    The patient tolerated the procedure well. There were no complications. The patient was given a prescription for repeat treatment in 12 weeks.     Unavoidable waste 45 units    RTC in 12 weeks for Botox     TAMI Preciado

## 2024-05-29 ENCOUNTER — PROCEDURE VISIT (OUTPATIENT)
Dept: NEUROLOGY | Facility: CLINIC | Age: 39
End: 2024-05-29
Payer: MEDICARE

## 2024-05-29 VITALS
HEART RATE: 72 BPM | WEIGHT: 192.88 LBS | HEIGHT: 63 IN | DIASTOLIC BLOOD PRESSURE: 93 MMHG | SYSTOLIC BLOOD PRESSURE: 128 MMHG | BODY MASS INDEX: 34.18 KG/M2 | RESPIRATION RATE: 18 BRPM

## 2024-05-29 DIAGNOSIS — G43.719 INTRACTABLE CHRONIC MIGRAINE WITHOUT AURA AND WITHOUT STATUS MIGRAINOSUS: Primary | ICD-10-CM

## 2024-05-29 PROCEDURE — 64615 CHEMODENERV MUSC MIGRAINE: CPT | Mod: S$PBB,,, | Performed by: NURSE PRACTITIONER

## 2024-05-29 PROCEDURE — 64615 CHEMODENERV MUSC MIGRAINE: CPT | Mod: PBBFAC,PO | Performed by: NURSE PRACTITIONER

## 2024-05-29 PROCEDURE — 99999PBSHW PR PBB SHADOW TECHNICAL ONLY FILED TO HB: Mod: JW,PBBFAC,,

## 2024-05-29 RX ORDER — CEFDINIR 300 MG/1
300 CAPSULE ORAL 2 TIMES DAILY
COMMUNITY
Start: 2024-05-23 | End: 2024-06-02

## 2024-05-29 RX ORDER — OFLOXACIN 3 MG/ML
5 SOLUTION AURICULAR (OTIC)
COMMUNITY
Start: 2024-05-23 | End: 2024-05-30

## 2024-05-29 RX ADMIN — ONABOTULINUMTOXINA 200 UNITS: 100 INJECTION, POWDER, LYOPHILIZED, FOR SOLUTION INTRADERMAL; INTRAMUSCULAR at 01:05

## 2024-05-29 NOTE — PROCEDURES
Procedures   BOTOX PROCEDURE    PROCEDURE PERFORMED: Botulinum toxin injection (73527)    CLINICAL INDICATION: G43.719    Cycle #14    Amy continues to get significant benefit from Botox.  She continues to have significant benefit from Botox for chronic migraine prevention.     A time out was conducted just before the start of the procedure to verify the correct patient and procedure, procedure location, and all relevant critical information.   Signed consent obtained prior to procedure     Conventional methods of treatment but the patient has been unresponsive and refractory.The patient meets criteria for chronic headaches according to the ICHD-III, the patient has more than 15 headaches a month at least 8 of them meet migraine criteria, which last for more than 4 hours a day.     Last Botox injections were 12 weeks ago and  there has been over 50%  improvement in the patient's symptoms. Frequency of treatment is every 12 weeks unless no response to the treatments, at which time we will discontinue the injections.     DESCRIPTION OF PROCEDURE: After obtaining informed consent and under  aseptic technique, a total of 155 units of botulinum toxin type A were   injected in the following muscles: Procerus 5 units,  5 units  bilaterally, frontalis 20 units, temporalis 20 units bilaterally,  occipitalis 15 units, upper cervical paraspinals 10 units bilaterally and trapezius 15 units bilaterally. The patient was given a total of 155 units in 31 sites.    The patient tolerated the procedure well. There were no complications. The patient was given a prescription for repeat treatment in 12 weeks.     Unavoidable waste 45 units    RTC in 12 weeks for Botox     TAMI Preciado

## 2024-08-21 ENCOUNTER — PROCEDURE VISIT (OUTPATIENT)
Dept: NEUROLOGY | Facility: CLINIC | Age: 39
End: 2024-08-21
Payer: MEDICARE

## 2024-08-21 VITALS
HEART RATE: 65 BPM | WEIGHT: 189.69 LBS | DIASTOLIC BLOOD PRESSURE: 80 MMHG | BODY MASS INDEX: 33.61 KG/M2 | HEIGHT: 63 IN | SYSTOLIC BLOOD PRESSURE: 114 MMHG

## 2024-08-21 DIAGNOSIS — G43.719 INTRACTABLE CHRONIC MIGRAINE WITHOUT AURA AND WITHOUT STATUS MIGRAINOSUS: Primary | ICD-10-CM

## 2024-08-21 PROCEDURE — 64615 CHEMODENERV MUSC MIGRAINE: CPT | Mod: PBBFAC,PO | Performed by: NURSE PRACTITIONER

## 2024-08-21 PROCEDURE — 99999PBSHW PR PBB SHADOW TECHNICAL ONLY FILED TO HB: Mod: PBBFAC,,,

## 2024-08-21 RX ADMIN — ONABOTULINUMTOXINA 200 UNITS: 100 INJECTION, POWDER, LYOPHILIZED, FOR SOLUTION INTRADERMAL; INTRAMUSCULAR at 01:08

## 2024-08-21 NOTE — PROCEDURES
Procedures   BOTOX PROCEDURE    PROCEDURE PERFORMED: Botulinum toxin injection (06737)    CLINICAL INDICATION: G43.719    Cycle #15    Aym continues to get significant benefit from Botox.  She continues to have significant benefit from Botox for chronic migraine prevention.     A time out was conducted just before the start of the procedure to verify the correct patient and procedure, procedure location, and all relevant critical information.   Signed consent obtained prior to procedure     Conventional methods of treatment but the patient has been unresponsive and refractory.The patient meets criteria for chronic headaches according to the ICHD-III, the patient has more than 15 headaches a month at least 8 of them meet migraine criteria, which last for more than 4 hours a day.     Last Botox injections were 12 weeks ago and  there has been over 50%  improvement in the patient's symptoms. Frequency of treatment is every 12 weeks unless no response to the treatments, at which time we will discontinue the injections.     DESCRIPTION OF PROCEDURE: After obtaining informed consent and under  aseptic technique, a total of 155 units of botulinum toxin type A were   injected in the following muscles: Procerus 5 units,  5 units  bilaterally, frontalis 20 units, temporalis 20 units bilaterally,  occipitalis 15 units, upper cervical paraspinals 10 units bilaterally and trapezius 15 units bilaterally. The patient was given a total of 155 units in 31 sites.    The patient tolerated the procedure well. There were no complications. The patient was given a prescription for repeat treatment in 12 weeks.     Unavoidable waste 45 units    RTC in 12 weeks for Botox     TAMI Preciado

## 2024-10-15 DIAGNOSIS — Z12.31 ENCOUNTER FOR SCREENING MAMMOGRAM FOR MALIGNANT NEOPLASM OF BREAST: Primary | ICD-10-CM

## 2024-11-13 ENCOUNTER — PROCEDURE VISIT (OUTPATIENT)
Dept: NEUROLOGY | Facility: CLINIC | Age: 39
End: 2024-11-13
Payer: MEDICARE

## 2024-11-13 VITALS
TEMPERATURE: 98 F | WEIGHT: 189.63 LBS | HEART RATE: 75 BPM | BODY MASS INDEX: 33.6 KG/M2 | HEIGHT: 63 IN | SYSTOLIC BLOOD PRESSURE: 116 MMHG | DIASTOLIC BLOOD PRESSURE: 74 MMHG | RESPIRATION RATE: 17 BRPM

## 2024-11-13 DIAGNOSIS — G43.719 INTRACTABLE CHRONIC MIGRAINE WITHOUT AURA AND WITHOUT STATUS MIGRAINOSUS: Primary | ICD-10-CM

## 2024-11-13 DIAGNOSIS — R11.0 NAUSEA: ICD-10-CM

## 2024-11-13 PROCEDURE — 64615 CHEMODENERV MUSC MIGRAINE: CPT | Mod: S$PBB,,, | Performed by: NURSE PRACTITIONER

## 2024-11-13 PROCEDURE — 99999PBSHW PR PBB SHADOW TECHNICAL ONLY FILED TO HB: Mod: PBBFAC,,,

## 2024-11-13 PROCEDURE — 64615 CHEMODENERV MUSC MIGRAINE: CPT | Mod: PBBFAC,PO | Performed by: NURSE PRACTITIONER

## 2024-11-13 PROCEDURE — 96372 THER/PROPH/DIAG INJ SC/IM: CPT | Mod: PBBFAC,59,PO

## 2024-11-13 RX ORDER — ONDANSETRON HYDROCHLORIDE 2 MG/ML
4 INJECTION, SOLUTION INTRAVENOUS
Status: COMPLETED | OUTPATIENT
Start: 2024-11-13 | End: 2024-11-13

## 2024-11-13 RX ORDER — KETOROLAC TROMETHAMINE 30 MG/ML
30 INJECTION, SOLUTION INTRAMUSCULAR; INTRAVENOUS
Status: COMPLETED | OUTPATIENT
Start: 2024-11-13 | End: 2024-11-13

## 2024-11-13 RX ADMIN — KETOROLAC TROMETHAMINE 30 MG: 30 INJECTION, SOLUTION INTRAMUSCULAR; INTRAVENOUS at 02:11

## 2024-11-13 RX ADMIN — ONDANSETRON HYDROCHLORIDE 4 MG: 2 SOLUTION INTRAMUSCULAR; INTRAVENOUS at 02:11

## 2024-11-13 RX ADMIN — ONABOTULINUMTOXINA 200 UNITS: 100 INJECTION, POWDER, LYOPHILIZED, FOR SOLUTION INTRADERMAL; INTRAMUSCULAR at 02:11

## 2024-11-13 NOTE — PROCEDURES
Procedures   BOTOX PROCEDURE    PROCEDURE PERFORMED: Botulinum toxin injection ([id number])    CLINICAL INDICATION: G43.719    Cycle #16    Amy continues to get significant benefit from Botox.  She continues to have significant benefit from Botox for chronic migraine prevention.     A time out was conducted just before the start of the procedure to verify the correct patient and procedure, procedure location, and all relevant critical information.   Signed consent obtained prior to procedure     Conventional methods of treatment but the patient has been unresponsive and refractory.The patient meets criteria for chronic headaches according to the ICHD-III, the patient has more than 15 headaches a month at least 8 of them meet migraine criteria, which last for more than 4 hours a day.     Last Botox injections were 12 weeks ago and  there has been over 50%  improvement in the patient's symptoms. Frequency of treatment is every 12 weeks unless no response to the treatments, at which time we will discontinue the injections.     DESCRIPTION OF PROCEDURE: After obtaining informed consent and under  aseptic technique, a total of 155 units of botulinum toxin type A were   injected in the following muscles: Procerus 5 units,  5 units  bilaterally, frontalis 20 units, temporalis 20 units bilaterally,  occipitalis 15 units, upper cervical paraspinals 10 units bilaterally and trapezius 15 units bilaterally. The patient was given a total of 155 units in 31 sites.    The patient tolerated the procedure well. There were no complications. The patient was given a prescription for repeat treatment in 12 weeks.     Unavoidable waste 45 units    RTC in 12 weeks for Botox     TAIM Preciado

## 2025-02-11 ENCOUNTER — PROCEDURE VISIT (OUTPATIENT)
Dept: NEUROLOGY | Facility: CLINIC | Age: 40
End: 2025-02-11
Payer: COMMERCIAL

## 2025-02-11 VITALS
SYSTOLIC BLOOD PRESSURE: 125 MMHG | BODY MASS INDEX: 33.6 KG/M2 | RESPIRATION RATE: 17 BRPM | DIASTOLIC BLOOD PRESSURE: 86 MMHG | WEIGHT: 189.63 LBS | HEART RATE: 73 BPM | HEIGHT: 63 IN

## 2025-02-11 DIAGNOSIS — G43.719 INTRACTABLE CHRONIC MIGRAINE WITHOUT AURA AND WITHOUT STATUS MIGRAINOSUS: Primary | ICD-10-CM

## 2025-02-11 PROCEDURE — 64615 CHEMODENERV MUSC MIGRAINE: CPT | Mod: S$GLB,,, | Performed by: NURSE PRACTITIONER

## 2025-02-11 RX ADMIN — ONABOTULINUMTOXINA 200 UNITS: 100 INJECTION, POWDER, LYOPHILIZED, FOR SOLUTION INTRADERMAL; INTRAMUSCULAR at 02:02

## 2025-02-11 NOTE — PROCEDURES
Procedures   BOTOX PROCEDURE    PROCEDURE PERFORMED: Botulinum toxin injection ([id number])    CLINICAL INDICATION: G43.719    Cycle #17    Amy continues to get significant benefit from Botox.  She continues to have significant benefit from Botox for chronic migraine prevention.     A time out was conducted just before the start of the procedure to verify the correct patient and procedure, procedure location, and all relevant critical information.   Signed consent obtained prior to procedure     Conventional methods of treatment but the patient has been unresponsive and refractory.The patient meets criteria for chronic headaches according to the ICHD-III, the patient has more than 15 headaches a month at least 8 of them meet migraine criteria, which last for more than 4 hours a day.     Last Botox injections were 12 weeks ago and  there has been over 50%  improvement in the patient's symptoms. Frequency of treatment is every 12 weeks unless no response to the treatments, at which time we will discontinue the injections.     DESCRIPTION OF PROCEDURE: After obtaining informed consent and under  aseptic technique, a total of 155 units of botulinum toxin type A were   injected in the following muscles: Procerus 5 units,  5 units  bilaterally, frontalis 20 units, temporalis 20 units bilaterally,  occipitalis 15 units, upper cervical paraspinals 10 units bilaterally and trapezius 15 units bilaterally. The patient was given a total of 155 units in 31 sites.    The patient tolerated the procedure well. There were no complications. The patient was given a prescription for repeat treatment in 12 weeks.     Unavoidable waste 45 units    RTC in 12 weeks for Botox     TAMI Preciado

## 2025-05-05 ENCOUNTER — PATIENT MESSAGE (OUTPATIENT)
Dept: NEUROLOGY | Facility: CLINIC | Age: 40
End: 2025-05-05
Payer: MEDICARE

## 2025-05-15 ENCOUNTER — PROCEDURE VISIT (OUTPATIENT)
Dept: NEUROLOGY | Facility: CLINIC | Age: 40
End: 2025-05-15
Payer: MEDICARE

## 2025-05-15 VITALS
TEMPERATURE: 98 F | HEART RATE: 61 BPM | BODY MASS INDEX: 33.6 KG/M2 | SYSTOLIC BLOOD PRESSURE: 110 MMHG | RESPIRATION RATE: 17 BRPM | HEIGHT: 63 IN | WEIGHT: 189.63 LBS | DIASTOLIC BLOOD PRESSURE: 77 MMHG

## 2025-05-15 DIAGNOSIS — G43.719 INTRACTABLE CHRONIC MIGRAINE WITHOUT AURA AND WITHOUT STATUS MIGRAINOSUS: Primary | ICD-10-CM

## 2025-05-15 RX ADMIN — ONABOTULINUMTOXINA 200 UNITS: 100 INJECTION, POWDER, LYOPHILIZED, FOR SOLUTION INTRADERMAL; INTRAMUSCULAR at 03:05

## 2025-05-15 NOTE — PROCEDURES
Procedures   BOTOX PROCEDURE    PROCEDURE PERFORMED: Botulinum toxin injection ([id number])    CLINICAL INDICATION: G43.719    Cycle #18    Amy continues to get significant benefit from Botox.  She continues to have significant benefit from Botox for chronic migraine prevention.     A time out was conducted just before the start of the procedure to verify the correct patient and procedure, procedure location, and all relevant critical information.   Signed consent obtained prior to procedure     Conventional methods of treatment but the patient has been unresponsive and refractory.The patient meets criteria for chronic headaches according to the ICHD-III, the patient has more than 15 headaches a month at least 8 of them meet migraine criteria, which last for more than 4 hours a day.     Last Botox injections were 12 weeks ago and  there has been over 50%  improvement in the patient's symptoms. Frequency of treatment is every 12 weeks unless no response to the treatments, at which time we will discontinue the injections.     DESCRIPTION OF PROCEDURE: After obtaining informed consent and under  aseptic technique, a total of 155 units of botulinum toxin type A were   injected in the following muscles: Procerus 5 units,  5 units  bilaterally, frontalis 20 units, temporalis 20 units bilaterally,  occipitalis 15 units, upper cervical paraspinals 10 units bilaterally and trapezius 15 units bilaterally. The patient was given a total of 155 units in 31 sites.    The patient tolerated the procedure well. There were no complications. The patient was given a prescription for repeat treatment in 12 weeks.     Unavoidable waste 45 units    RTC in 12 weeks for Botox     TAMI Preciado

## 2025-08-11 ENCOUNTER — PROCEDURE VISIT (OUTPATIENT)
Dept: NEUROLOGY | Facility: CLINIC | Age: 40
End: 2025-08-11
Payer: MEDICARE

## 2025-08-11 VITALS
HEART RATE: 76 BPM | TEMPERATURE: 98 F | WEIGHT: 189.63 LBS | SYSTOLIC BLOOD PRESSURE: 114 MMHG | DIASTOLIC BLOOD PRESSURE: 80 MMHG | BODY MASS INDEX: 33.6 KG/M2 | HEIGHT: 63 IN | RESPIRATION RATE: 17 BRPM

## 2025-08-11 DIAGNOSIS — G43.719 INTRACTABLE CHRONIC MIGRAINE WITHOUT AURA AND WITHOUT STATUS MIGRAINOSUS: Primary | ICD-10-CM

## 2025-08-11 PROCEDURE — 64615 CHEMODENERV MUSC MIGRAINE: CPT | Mod: S$GLB,,, | Performed by: NURSE PRACTITIONER

## 2025-08-11 RX ADMIN — ONABOTULINUMTOXINA 200 UNITS: 100 INJECTION, POWDER, LYOPHILIZED, FOR SOLUTION INTRADERMAL; INTRAMUSCULAR at 01:08
